# Patient Record
Sex: FEMALE | Race: OTHER | Employment: OTHER | URBAN - METROPOLITAN AREA
[De-identification: names, ages, dates, MRNs, and addresses within clinical notes are randomized per-mention and may not be internally consistent; named-entity substitution may affect disease eponyms.]

---

## 2019-06-27 ENCOUNTER — ED HISTORICAL/CONVERTED ENCOUNTER (OUTPATIENT)
Dept: OTHER | Age: 59
End: 2019-06-27

## 2019-07-01 ENCOUNTER — ED HISTORICAL/CONVERTED ENCOUNTER (OUTPATIENT)
Dept: OTHER | Age: 59
End: 2019-07-01

## 2019-12-07 ENCOUNTER — ED HISTORICAL/CONVERTED ENCOUNTER (OUTPATIENT)
Dept: OTHER | Age: 59
End: 2019-12-07

## 2022-02-16 ENCOUNTER — TRANSCRIBE ORDER (OUTPATIENT)
Dept: SCHEDULING | Age: 62
End: 2022-02-16

## 2022-02-16 DIAGNOSIS — K58.1 IRRITABLE BOWEL SYNDROME WITH CONSTIPATION: ICD-10-CM

## 2022-02-16 DIAGNOSIS — R12 HEARTBURN: ICD-10-CM

## 2022-02-16 DIAGNOSIS — K21.9 GERD (GASTROESOPHAGEAL REFLUX DISEASE): ICD-10-CM

## 2022-02-16 DIAGNOSIS — Z86.19 HISTORY OF HELICOBACTER INFECTION: ICD-10-CM

## 2022-02-16 DIAGNOSIS — R11.10 REGURGITATION OF FOOD: ICD-10-CM

## 2022-02-16 DIAGNOSIS — Z86.010 PERSONAL HISTORY OF COLONIC POLYPS: ICD-10-CM

## 2022-02-16 DIAGNOSIS — R11.0 NAUSEA: ICD-10-CM

## 2022-02-16 DIAGNOSIS — R10.9 LEFT SIDED ABDOMINAL PAIN: Primary | ICD-10-CM

## 2022-02-16 DIAGNOSIS — Z80.0 FAMILY HISTORY OF COLON CANCER: ICD-10-CM

## 2022-03-01 ENCOUNTER — TRANSCRIBE ORDER (OUTPATIENT)
Dept: SCHEDULING | Age: 62
End: 2022-03-01

## 2022-03-01 DIAGNOSIS — Z86.19 HISTORY OF HELICOBACTER PYLORI INFECTION: ICD-10-CM

## 2022-03-01 DIAGNOSIS — R11.10 REGURGITATION OF FOOD: ICD-10-CM

## 2022-03-01 DIAGNOSIS — R10.9 LEFT SIDED ABDOMINAL PAIN: Primary | ICD-10-CM

## 2022-03-01 DIAGNOSIS — K58.1 IRRITABLE BOWEL SYNDROME WITH CONSTIPATION: ICD-10-CM

## 2022-03-01 DIAGNOSIS — R12 HEARTBURN: ICD-10-CM

## 2022-03-01 DIAGNOSIS — K21.9 GERD (GASTROESOPHAGEAL REFLUX DISEASE): ICD-10-CM

## 2022-03-01 DIAGNOSIS — R11.0 NAUSEA: ICD-10-CM

## 2022-03-02 ENCOUNTER — TRANSCRIBE ORDER (OUTPATIENT)
Dept: SCHEDULING | Age: 62
End: 2022-03-02

## 2022-03-02 DIAGNOSIS — K21.9 ESOPHAGEAL REFLUX: Primary | ICD-10-CM

## 2022-03-04 ENCOUNTER — HOSPITAL ENCOUNTER (OUTPATIENT)
Dept: CT IMAGING | Age: 62
Discharge: HOME | End: 2022-03-04
Payer: OTHER GOVERNMENT

## 2022-03-04 DIAGNOSIS — R11.10 REGURGITATION OF FOOD: ICD-10-CM

## 2022-03-04 DIAGNOSIS — R10.9 LEFT SIDED ABDOMINAL PAIN: ICD-10-CM

## 2022-03-04 DIAGNOSIS — K21.9 GERD (GASTROESOPHAGEAL REFLUX DISEASE): ICD-10-CM

## 2022-03-04 DIAGNOSIS — R12 HEARTBURN: ICD-10-CM

## 2022-03-04 DIAGNOSIS — R11.0 NAUSEA: ICD-10-CM

## 2022-03-04 DIAGNOSIS — K58.1 IRRITABLE BOWEL SYNDROME WITH CONSTIPATION: ICD-10-CM

## 2022-03-04 DIAGNOSIS — Z86.19 HISTORY OF HELICOBACTER PYLORI INFECTION: ICD-10-CM

## 2022-03-04 PROCEDURE — 74176 CT ABD & PELVIS W/O CONTRAST: CPT

## 2022-06-29 ENCOUNTER — OFFICE VISIT (OUTPATIENT)
Dept: OBGYN CLINIC | Age: 62
End: 2022-06-29
Payer: OTHER GOVERNMENT

## 2022-06-29 VITALS
DIASTOLIC BLOOD PRESSURE: 76 MMHG | WEIGHT: 287.13 LBS | BODY MASS INDEX: 50.88 KG/M2 | SYSTOLIC BLOOD PRESSURE: 134 MMHG | HEIGHT: 63 IN

## 2022-06-29 DIAGNOSIS — Z00.00 ANNUAL VISIT FOR GENERAL ADULT MEDICAL EXAMINATION WITHOUT ABNORMAL FINDINGS: Primary | ICD-10-CM

## 2022-06-29 DIAGNOSIS — Z11.51 SCREENING FOR HUMAN PAPILLOMAVIRUS: ICD-10-CM

## 2022-06-29 DIAGNOSIS — Z01.419 PAP SMEAR, AS PART OF ROUTINE GYNECOLOGICAL EXAMINATION: ICD-10-CM

## 2022-06-29 PROBLEM — M25.512 PAIN IN LEFT SHOULDER: Status: ACTIVE | Noted: 2022-06-29

## 2022-06-29 PROBLEM — N39.3 FEMALE STRESS INCONTINENCE: Status: ACTIVE | Noted: 2022-06-29

## 2022-06-29 PROBLEM — H20.9 IRITIS: Status: ACTIVE | Noted: 2022-06-29

## 2022-06-29 PROBLEM — R00.2 PALPITATIONS: Status: ACTIVE | Noted: 2018-01-04

## 2022-06-29 PROBLEM — L21.9 SEBORRHEIC DERMATITIS: Status: ACTIVE | Noted: 2022-06-29

## 2022-06-29 PROBLEM — M79.673 PAIN OF FOOT: Status: ACTIVE | Noted: 2022-06-29

## 2022-06-29 PROBLEM — R06.09 OTHER DYSPNEA AND RESPIRATORY ABNORMALITY: Status: ACTIVE | Noted: 2022-06-29

## 2022-06-29 PROBLEM — R42 VERTIGO: Status: ACTIVE | Noted: 2018-01-04

## 2022-06-29 PROBLEM — K46.9 ABDOMINAL HERNIA: Status: ACTIVE | Noted: 2018-01-04

## 2022-06-29 PROBLEM — M54.50 LOW BACK PAIN: Status: ACTIVE | Noted: 2018-02-13

## 2022-06-29 PROBLEM — L30.4 ERYTHEMA INTERTRIGO: Status: ACTIVE | Noted: 2019-01-29

## 2022-06-29 PROBLEM — J30.9 ALLERGIC RHINITIS, UNSPECIFIED: Status: ACTIVE | Noted: 2018-02-13

## 2022-06-29 PROBLEM — M54.2 CERVICALGIA: Status: ACTIVE | Noted: 2019-07-02

## 2022-06-29 PROBLEM — E78.5 HYPERLIPIDEMIA, UNSPECIFIED: Status: ACTIVE | Noted: 2017-10-06

## 2022-06-29 PROBLEM — M72.2 PLANTAR FASCIITIS: Status: ACTIVE | Noted: 2018-01-04

## 2022-06-29 PROBLEM — E11.9 DIABETES (HCC): Status: ACTIVE | Noted: 2018-01-04

## 2022-06-29 PROBLEM — R09.89 OTHER DYSPNEA AND RESPIRATORY ABNORMALITY: Status: ACTIVE | Noted: 2022-06-29

## 2022-06-29 PROBLEM — R55 SYNCOPE: Status: ACTIVE | Noted: 2018-01-04

## 2022-06-29 PROBLEM — G47.30 SLEEP APNEA: Status: ACTIVE | Noted: 2017-09-27

## 2022-06-29 PROBLEM — E03.9 HYPOTHYROIDISM: Status: ACTIVE | Noted: 2018-01-04

## 2022-06-29 PROBLEM — R07.9 CHEST PAIN: Status: ACTIVE | Noted: 2018-01-04

## 2022-06-29 PROBLEM — N95.2 POSTMENOPAUSAL ATROPHIC VAGINITIS: Status: ACTIVE | Noted: 2018-02-13

## 2022-06-29 PROBLEM — R25.1 TREMOR: Status: ACTIVE | Noted: 2022-04-22

## 2022-06-29 PROBLEM — K29.60 OTHER GASTRITIS WITHOUT BLEEDING: Status: ACTIVE | Noted: 2019-06-28

## 2022-06-29 PROBLEM — J45.40 MODERATE PERSISTENT ASTHMA, UNCOMPLICATED: Status: ACTIVE | Noted: 2019-01-29

## 2022-06-29 PROBLEM — M17.11 UNILATERAL PRIMARY OSTEOARTHRITIS, RIGHT KNEE: Status: ACTIVE | Noted: 2022-06-29

## 2022-06-29 PROBLEM — H81.10 BENIGN PAROXYSMAL POSITIONAL VERTIGO: Status: ACTIVE | Noted: 2022-06-29

## 2022-06-29 PROBLEM — R60.9 EDEMA: Status: ACTIVE | Noted: 2018-01-04

## 2022-06-29 PROBLEM — M19.90 OSTEOARTHROSIS: Status: ACTIVE | Noted: 2022-06-29

## 2022-06-29 PROBLEM — R41.82 ALTERED MENTAL STATUS: Status: ACTIVE | Noted: 2022-03-31

## 2022-06-29 PROBLEM — E11.9 TYPE 2 DIABETES MELLITUS WITHOUT COMPLICATIONS (HCC): Status: ACTIVE | Noted: 2019-02-14

## 2022-06-29 PROBLEM — K57.92 DIVERTICULITIS: Status: ACTIVE | Noted: 2018-01-04

## 2022-06-29 PROBLEM — K64.9 UNSPECIFIED HEMORRHOIDS: Status: ACTIVE | Noted: 2019-06-28

## 2022-06-29 PROCEDURE — 99386 PREV VISIT NEW AGE 40-64: CPT | Performed by: OBSTETRICS & GYNECOLOGY

## 2022-06-29 RX ORDER — BENZONATATE 100 MG/1
CAPSULE ORAL
COMMUNITY

## 2022-06-29 RX ORDER — TROSPIUM CHLORIDE 20 MG/1
20 TABLET, FILM COATED ORAL 2 TIMES DAILY
COMMUNITY

## 2022-06-29 RX ORDER — CETIRIZINE HCL 10 MG
10 TABLET ORAL DAILY
COMMUNITY

## 2022-06-29 RX ORDER — IBUPROFEN 600 MG/1
800 TABLET ORAL
COMMUNITY

## 2022-06-29 RX ORDER — CONJUGATED ESTROGENS 0.62 MG/G
CREAM VAGINAL
COMMUNITY

## 2022-06-29 RX ORDER — BISACODYL 5 MG
TABLET, DELAYED RELEASE (ENTERIC COATED) ORAL
COMMUNITY
Start: 2022-06-16

## 2022-06-29 RX ORDER — ATORVASTATIN CALCIUM 20 MG/1
20 TABLET, FILM COATED ORAL DAILY
COMMUNITY

## 2022-06-29 RX ORDER — OXYMETAZOLINE HCL 0.05 %
SPRAY, NON-AEROSOL (ML) NASAL
COMMUNITY
Start: 2021-09-01

## 2022-06-29 RX ORDER — HYDROXYZINE 25 MG/1
TABLET, FILM COATED ORAL
COMMUNITY

## 2022-06-29 RX ORDER — FLUTICASONE PROPIONATE AND SALMETEROL 500; 50 UG/1; UG/1
POWDER RESPIRATORY (INHALATION)
COMMUNITY
Start: 2021-09-01 | End: 2022-09-01

## 2022-06-29 RX ORDER — OMEPRAZOLE 20 MG/1
20 CAPSULE, DELAYED RELEASE ORAL
COMMUNITY
Start: 2022-02-15 | End: 2023-02-15

## 2022-06-29 RX ORDER — DICLOFENAC SODIUM 10 MG/G
GEL TOPICAL
COMMUNITY

## 2022-06-29 RX ORDER — BENZTROPINE MESYLATE 2 MG/1
2 TABLET ORAL 3 TIMES DAILY
COMMUNITY
Start: 2022-04-01

## 2022-06-29 RX ORDER — FLUTICASONE PROPIONATE AND SALMETEROL 100; 50 UG/1; UG/1
POWDER RESPIRATORY (INHALATION)
COMMUNITY

## 2022-06-29 RX ORDER — GABAPENTIN 600 MG/1
600 TABLET ORAL 3 TIMES DAILY
COMMUNITY

## 2022-06-29 RX ORDER — BLOOD-GLUCOSE METER
KIT MISCELLANEOUS
COMMUNITY

## 2022-06-29 RX ORDER — CODEINE PHOSPHATE AND GUAIFENESIN 10; 100 MG/5ML; MG/5ML
SOLUTION ORAL
COMMUNITY

## 2022-06-29 RX ORDER — RANITIDINE 150 MG/1
TABLET, FILM COATED ORAL
COMMUNITY

## 2022-06-29 RX ORDER — ALBUTEROL SULFATE 90 UG/1
AEROSOL, METERED RESPIRATORY (INHALATION)
COMMUNITY

## 2022-06-29 RX ORDER — GUAIFENESIN/DEXTROMETHORPHAN 100-10MG/5
SYRUP ORAL
COMMUNITY

## 2022-06-29 RX ORDER — INSULIN PUMP SYRINGE, 3 ML
EACH MISCELLANEOUS
COMMUNITY

## 2022-06-29 RX ORDER — BLOOD-GLUCOSE METER
EACH MISCELLANEOUS
COMMUNITY

## 2022-06-29 RX ORDER — MONTELUKAST SODIUM 10 MG/1
10 TABLET ORAL AT BEDTIME
COMMUNITY

## 2022-06-29 RX ORDER — HYDROCORTISONE 25 MG/G
CREAM TOPICAL
COMMUNITY

## 2022-06-29 RX ORDER — BLOOD-GLUCOSE CONTROL, NORMAL
EACH MISCELLANEOUS
COMMUNITY

## 2022-06-29 RX ORDER — AZITHROMYCIN 250 MG/1
250 TABLET, FILM COATED ORAL
COMMUNITY
Start: 2021-11-15 | End: 2022-11-15

## 2022-06-29 NOTE — PROGRESS NOTES
Devin Silvestre is a 64 y.o. female who presents today for the following:  Chief Complaint   Patient presents with    Annual Exam        Allergies   Allergen Reactions    Latex Itching and Rash     Reaction Type: Allergy      Aspirin Other (comments), Anaphylaxis and Swelling     inflammation  Other reaction(s): Unknown  Reaction Type: Allergy; Reaction(s): inflammation/rash  inflammation      Oxycodone Shortness of Breath    Hydrocodone-Acetaminophen Swelling    Iodine Other (comments)       Current Outpatient Medications   Medication Sig    benztropine (COGENTIN) 2 mg tablet Take 2 mg by mouth three (3) times daily.  fluticasone propion-salmeteroL (Advair Diskus) 500-50 mcg/dose diskus inhaler Take  by inhalation.  bisacodyL (DULCOLAX) 5 mg EC tablet Do not crush, chew, or split. Take as directed    azithromycin (ZITHROMAX) 250 mg tablet Take 250 mg by mouth.  oxymetazoline (AFRIN) 0.05 % nasal spray     omeprazole (PRILOSEC) 20 mg capsule Take 20 mg by mouth.  lancets (BD Ultra-Fine II Lancets) 30 gauge misc BD Ultra-Fine II Lancets 30 gauge    Blood-Glucose Meter misc FreeStyle Freedom Lite kit    Blood-Glucose Meter (FreeStyle Freedom Lite) monitoring kit FreeStyle Freedom Lite kit    glucose blood VI test strips (FreeStyle Lite Strips) strip FreeStyle Lite Strips    tramadol HCl/acetaminophen (TRAMADOL-ACETAMINOPHEN PO) tramadol    gabapentin (NEURONTIN) 600 mg tablet Take 600 mg by mouth three (3) times daily.  hydrOXYzine HCL (ATARAX) 25 mg tablet hydroxyzine HCl 25 mg tablet    cetirizine (ZYRTEC) 10 mg tablet Take 10 mg by mouth daily.  atorvastatin (LIPITOR) 20 mg tablet Take 20 mg by mouth daily.     benzonatate (TESSALON) 100 mg capsule benzonatate 100 mg capsule    albuterol (PROVENTIL HFA, VENTOLIN HFA, PROAIR HFA) 90 mcg/actuation inhaler ProAir HFA 90 mcg/actuation aerosol inhaler    fluticasone propion-salmeteroL (ADVAIR/WIXELA) 100-50 mcg/dose diskus inhaler Advair Diskus 100 mcg-50 mcg/dose powder for inhalation    raNITIdine (ZANTAC) 150 mg tablet ranitidine 150 mg tablet    montelukast (SINGULAIR) 10 mg tablet Take 10 mg by mouth At bedtime.  fluticasone (VERAMYST) 27.5 mcg/actuation nasal spray 2 Sprays by Nasal route daily.  guaiFENesin-dextromethorphan (ROBITUSSIN DM) 100-10 mg/5 mL syrup Cough Suppressant-Expectorant 10 mg-100 mg/5 mL oral syrup    ibuprofen (MOTRIN) 600 mg tablet Take 800 mg by mouth every six (6) hours as needed.  guaiFENesin-codeine (Cheratussin AC) 100-10 mg/5 mL solution Cheratussin AC 10 mg-100 mg/5 mL oral liquid    hydrocortisone (Proctozone-HC) 2.5 % rectal cream Proctozone-HC 2.5 % topical cream perineal applicator    diclofenac (Voltaren) 1 % gel Voltaren 1 % topical gel    trospium (SANCTURA) 20 mg tablet Take 20 mg by mouth two (2) times a day.  conjugated estrogens (Premarin) 0.625 mg/gram vaginal cream Premarin 0.625 mg/gram vaginal cream    acetaminophen (TYLENOL) 325 mg tablet Take  by mouth every four (4) hours as needed for Pain.  metFORMIN ER (GLUCOPHAGE XR) 500 mg tablet Take 1 Tab by mouth two (2) times daily (with meals).  levothyroxine (SYNTHROID) 25 mcg tablet Take 1 Tab by mouth Daily (before breakfast). Except Sundays    Insulin Needles, Disposable, (ALEIDA PEN NEEDLE) 32 x 5/32 \" ndle Use once daily    furosemide (LASIX) 40 mg tablet Take  by mouth daily.  ESOMEPRAZOLE MAGNESIUM (NEXIUM PO) Take  by mouth.  linaclotide (LINZESS) 290 mcg cap Take  by mouth.  traMADol (ULTRAM) 50 mg tablet Take 50 mg by mouth every six (6) hours as needed for Pain.  potassium chloride SA (MICRO-K) 10 mEq capsule Take 10 mEq by mouth daily.  OXYCODONE HCL/ACETAMINOPHEN (PERCOCET PO) Take  by mouth as needed.  HYDROCODONE/ACETAMINOPHEN (VICODIN PO) Take  by mouth as needed. No current facility-administered medications for this visit. No past medical history on file.     No past surgical history on file. No family history on file. Social History     Socioeconomic History    Marital status:      Spouse name: Not on file    Number of children: Not on file    Years of education: Not on file    Highest education level: Not on file   Occupational History    Not on file   Tobacco Use    Smoking status: Never Smoker    Smokeless tobacco: Not on file   Substance and Sexual Activity    Alcohol use: Yes     Comment: occasionally    Drug use: No    Sexual activity: Not on file   Other Topics Concern    Not on file   Social History Narrative    Not on file     Social Determinants of Health     Financial Resource Strain:     Difficulty of Paying Living Expenses: Not on file   Food Insecurity:     Worried About Running Out of Food in the Last Year: Not on file    Petros of Food in the Last Year: Not on file   Transportation Needs:     Lack of Transportation (Medical): Not on file    Lack of Transportation (Non-Medical): Not on file   Physical Activity:     Days of Exercise per Week: Not on file    Minutes of Exercise per Session: Not on file   Stress:     Feeling of Stress : Not on file   Social Connections:     Frequency of Communication with Friends and Family: Not on file    Frequency of Social Gatherings with Friends and Family: Not on file    Attends Mosque Services: Not on file    Active Member of [address] [place] [address] Rentabilities or Organizations: Not on file    Attends Club or Organization Meetings: Not on file    Marital Status: Not on file   Intimate Partner Violence:     Fear of Current or Ex-Partner: Not on file    Emotionally Abused: Not on file    Physically Abused: Not on file    Sexually Abused: Not on file   Housing Stability:     Unable to Pay for Housing in the Last Year: Not on file    Number of [place] in the Last Year: Not on file    Unstable Housing in the Last Year: Not on file         ROS   Review of Systems   Constitutional: Negative. HENT: Negative. Eyes: Negative. Respiratory: Negative. Cardiovascular: Negative. Gastrointestinal: Negative. Genitourinary: Negative. Musculoskeletal: Negative. Skin: Negative. Neurological: Negative. Endo/Heme/Allergies: Negative. Psychiatric/Behavioral: Negative. /76   Ht 5' 3\" (1.6 m)   Wt 287 lb 2 oz (130.2 kg)   BMI 50.86 kg/m²    OBGyn Exam   Constitutional  · Appearance: well-nourished, well developed, alert, in no acute distress    HENT  · Head and Face: appears normal    Neck  · Inspection/Palpation: normal appearance, no masses or tenderness  · Lymph Nodes: no lymphadenopathy present  · Thyroid: gland size normal, nontender, no nodules or masses present on palpation    Breasts   Symmetric, no palpable masses, no tenderness, no skin changes, no nipple abnormality, no nipple discharge, no lymphadenopathy.     Chest  · Respiratory Effort: breathing labored  · Auscultation: normal breath sounds    Cardiovascular  · Heart:  · Auscultation: regular rate and rhythm without murmur    Gastrointestinal  · Abdominal Examination: abdomen non-tender to palpation, normal bowel sounds, no masses present  · Liver and spleen: no hepatomegaly present, spleen not palpable  · Hernias: no hernias identified    Genitourinary  · External Genitalia: normal appearance for age, no discharge present, no tenderness present, no inflammatory lesions present, no masses present, no atrophy present  · Vagina: normal vaginal vault without central or paravaginal defects, no discharge present, no inflammatory lesions present, no masses present  · Bladder: non-tender to palpation  · Urethra: appears normal  · Cervix: normal   · Uterus:LIMITED EXAM DUE TO PT BODY HABITUS  · Adnexa: LIMITED EXAM DUE TO PT BODY HABITUS  · Perineum: perineum within normal limits, no evidence of trauma, no rashes or skin lesions present  · Anus: anus within normal limits, no hemorrhoids present  · Inguinal Lymph Nodes: no lymphadenopathy present    Skin  · General Inspection: no rash, no lesions identified    Neurologic/Psychiatric  · Mental Status:  · Orientation: grossly oriented to person, place and time  · Mood and Affect: mood normal, affect appropriate    No results found for this visit on 06/29/22. Orders Placed This Encounter    lancets (BD Ultra-Fine II Lancets) 30 gauge misc     Sig: BD Ultra-Fine II Lancets 30 gauge    Blood-Glucose Meter misc     Sig: FreeStyle Freedom Lite kit    Blood-Glucose Meter (FreeStyle Freedom Lite) monitoring kit     Sig: FreeStyle Freedom Lite kit    glucose blood VI test strips (FreeStyle Lite Strips) strip     Sig: FreeStyle Lite Strips    tramadol HCl/acetaminophen (TRAMADOL-ACETAMINOPHEN PO)     Sig: tramadol    gabapentin (NEURONTIN) 600 mg tablet     Sig: Take 600 mg by mouth three (3) times daily.  hydrOXYzine HCL (ATARAX) 25 mg tablet     Sig: hydroxyzine HCl 25 mg tablet    cetirizine (ZYRTEC) 10 mg tablet     Sig: Take 10 mg by mouth daily.  atorvastatin (LIPITOR) 20 mg tablet     Sig: Take 20 mg by mouth daily.  benztropine (COGENTIN) 2 mg tablet     Sig: Take 2 mg by mouth three (3) times daily.  benzonatate (TESSALON) 100 mg capsule     Sig: benzonatate 100 mg capsule    albuterol (PROVENTIL HFA, VENTOLIN HFA, PROAIR HFA) 90 mcg/actuation inhaler     Sig: ProAir HFA 90 mcg/actuation aerosol inhaler    fluticasone propion-salmeteroL (ADVAIR/WIXELA) 100-50 mcg/dose diskus inhaler     Sig: Advair Diskus 100 mcg-50 mcg/dose powder for inhalation    fluticasone propion-salmeteroL (Advair Diskus) 500-50 mcg/dose diskus inhaler     Sig: Take  by inhalation.  raNITIdine (ZANTAC) 150 mg tablet     Sig: ranitidine 150 mg tablet    bisacodyL (DULCOLAX) 5 mg EC tablet     Sig: Do not crush, chew, or split. Take as directed    montelukast (SINGULAIR) 10 mg tablet     Sig: Take 10 mg by mouth At bedtime.  azithromycin (ZITHROMAX) 250 mg tablet     Sig: Take 250 mg by mouth.     fluticasone (VERAMYST) 27.5 mcg/actuation nasal spray     Si Sprays by Nasal route daily.  guaiFENesin-dextromethorphan (ROBITUSSIN DM) 100-10 mg/5 mL syrup     Sig: Cough Suppressant-Expectorant 10 mg-100 mg/5 mL oral syrup    oxymetazoline (AFRIN) 0.05 % nasal spray    ibuprofen (MOTRIN) 600 mg tablet     Sig: Take 800 mg by mouth every six (6) hours as needed.  guaiFENesin-codeine (Cheratussin AC) 100-10 mg/5 mL solution     Sig: Cheratussin AC 10 mg-100 mg/5 mL oral liquid    hydrocortisone (Proctozone-HC) 2.5 % rectal cream     Sig: Proctozone-HC 2.5 % topical cream perineal applicator    diclofenac (Voltaren) 1 % gel     Sig: Voltaren 1 % topical gel    trospium (SANCTURA) 20 mg tablet     Sig: Take 20 mg by mouth two (2) times a day.  conjugated estrogens (Premarin) 0.625 mg/gram vaginal cream     Sig: Premarin 0.625 mg/gram vaginal cream    omeprazole (PRILOSEC) 20 mg capsule     Sig: Take 20 mg by mouth. 65 yo ANNUAL  HX OF LUNG DISEASE  HX OF URINARY INCONTINENCE, FOLLOWED BY UROLOGY  MORBID OBESITY    1.  Annual visit for general adult medical examination without abnormal findings

## 2022-06-30 LAB
CYTOLOGIST CVX/VAG CYTO: NORMAL
CYTOLOGY CVX/VAG DOC CYTO: NORMAL
CYTOLOGY CVX/VAG DOC THIN PREP: NORMAL
DX ICD CODE: NORMAL
LABCORP, 190119: NORMAL
Lab: NORMAL
OTHER STN SPEC: NORMAL
STAT OF ADQ CVX/VAG CYTO-IMP: NORMAL

## 2022-07-05 ENCOUNTER — OFFICE VISIT (OUTPATIENT)
Dept: ORTHOPEDIC SURGERY | Age: 62
End: 2022-07-05
Payer: OTHER GOVERNMENT

## 2022-07-05 VITALS — HEIGHT: 63 IN | BODY MASS INDEX: 50.68 KG/M2 | WEIGHT: 286 LBS

## 2022-07-05 DIAGNOSIS — M25.561 CHRONIC PAIN OF BOTH KNEES: Primary | ICD-10-CM

## 2022-07-05 DIAGNOSIS — M25.562 CHRONIC PAIN OF BOTH KNEES: Primary | ICD-10-CM

## 2022-07-05 DIAGNOSIS — M17.0 BILATERAL PRIMARY OSTEOARTHRITIS OF KNEE: ICD-10-CM

## 2022-07-05 DIAGNOSIS — G89.29 CHRONIC PAIN OF BOTH KNEES: Primary | ICD-10-CM

## 2022-07-05 PROCEDURE — 99213 OFFICE O/P EST LOW 20 MIN: CPT | Performed by: ORTHOPAEDIC SURGERY

## 2022-07-05 PROCEDURE — 20610 DRAIN/INJ JOINT/BURSA W/O US: CPT | Performed by: ORTHOPAEDIC SURGERY

## 2022-07-05 RX ORDER — TRIAMCINOLONE ACETONIDE 40 MG/ML
40 INJECTION, SUSPENSION INTRA-ARTICULAR; INTRAMUSCULAR ONCE
Status: COMPLETED | OUTPATIENT
Start: 2022-07-05 | End: 2022-07-05

## 2022-07-05 RX ORDER — LIDOCAINE HYDROCHLORIDE 10 MG/ML
2 INJECTION INFILTRATION; PERINEURAL ONCE
Status: COMPLETED | OUTPATIENT
Start: 2022-07-05 | End: 2022-07-05

## 2022-07-05 RX ADMIN — TRIAMCINOLONE ACETONIDE 40 MG: 40 INJECTION, SUSPENSION INTRA-ARTICULAR; INTRAMUSCULAR at 17:28

## 2022-07-05 RX ADMIN — LIDOCAINE HYDROCHLORIDE 2 ML: 10 INJECTION INFILTRATION; PERINEURAL at 17:27

## 2022-07-05 NOTE — PROGRESS NOTES
[address] [facility] (: 1960) is a 64 y.o. female, patient, here for evaluation of the following chief complaint(s):  Knee Pain (work injury in  falling down stairs injuried right knee, fall outside in 2019 injuried left knee )       HPI:    Chief complaint is bilateral knee pain. She is had multiple falls. Here today for recheck both knees. Patient's BMI is 50.66. Allergies   Allergen Reactions    Latex Itching and Rash     Reaction Type: Allergy      Aspirin Other (comments), Anaphylaxis and Swelling     inflammation  Other reaction(s): Unknown  Reaction Type: Allergy; Reaction(s): inflammation/rash  inflammation      Oxycodone Shortness of Breath    Hydrocodone-Acetaminophen Swelling    Iodine Other (comments)       Current Outpatient Medications   Medication Sig    lancets (BD Ultra-Fine II Lancets) 30 gauge misc BD Ultra-Fine II Lancets 30 gauge    Blood-Glucose Meter misc FreeStyle Freedom Lite kit    Blood-Glucose Meter (FreeStyle Freedom Lite) monitoring kit FreeStyle Freedom Lite kit    glucose blood VI test strips (FreeStyle Lite Strips) strip FreeStyle Lite Strips    tramadol HCl/acetaminophen (TRAMADOL-ACETAMINOPHEN PO) tramadol    gabapentin (NEURONTIN) 600 mg tablet Take 600 mg by mouth three (3) times daily.  hydrOXYzine HCL (ATARAX) 25 mg tablet hydroxyzine HCl 25 mg tablet    cetirizine (ZYRTEC) 10 mg tablet Take 10 mg by mouth daily.  atorvastatin (LIPITOR) 20 mg tablet Take 20 mg by mouth daily.  benztropine (COGENTIN) 2 mg tablet Take 2 mg by mouth three (3) times daily.     benzonatate (TESSALON) 100 mg capsule benzonatate 100 mg capsule    albuterol (PROVENTIL HFA, VENTOLIN HFA, PROAIR HFA) 90 mcg/actuation inhaler ProAir HFA 90 mcg/actuation aerosol inhaler    fluticasone propion-salmeteroL (ADVAIR/WIXELA) 100-50 mcg/dose diskus inhaler Advair Diskus 100 mcg-50 mcg/dose powder for inhalation    fluticasone propion-salmeteroL (Advair Diskus) 500-50 mcg/dose diskus inhaler Take  by inhalation.  raNITIdine (ZANTAC) 150 mg tablet ranitidine 150 mg tablet    bisacodyL (DULCOLAX) 5 mg EC tablet Do not crush, chew, or split. Take as directed    montelukast (SINGULAIR) 10 mg tablet Take 10 mg by mouth At bedtime.  azithromycin (ZITHROMAX) 250 mg tablet Take 250 mg by mouth.  fluticasone (VERAMYST) 27.5 mcg/actuation nasal spray 2 Sprays by Nasal route daily.  guaiFENesin-dextromethorphan (ROBITUSSIN DM) 100-10 mg/5 mL syrup Cough Suppressant-Expectorant 10 mg-100 mg/5 mL oral syrup    oxymetazoline (AFRIN) 0.05 % nasal spray     ibuprofen (MOTRIN) 600 mg tablet Take 800 mg by mouth every six (6) hours as needed.  guaiFENesin-codeine (Cheratussin AC) 100-10 mg/5 mL solution Cheratussin AC 10 mg-100 mg/5 mL oral liquid    hydrocortisone (Proctozone-HC) 2.5 % rectal cream Proctozone-HC 2.5 % topical cream perineal applicator    diclofenac (Voltaren) 1 % gel Voltaren 1 % topical gel    trospium (SANCTURA) 20 mg tablet Take 20 mg by mouth two (2) times a day.  conjugated estrogens (Premarin) 0.625 mg/gram vaginal cream Premarin 0.625 mg/gram vaginal cream    omeprazole (PRILOSEC) 20 mg capsule Take 20 mg by mouth.  acetaminophen (TYLENOL) 325 mg tablet Take  by mouth every four (4) hours as needed for Pain.  metFORMIN ER (GLUCOPHAGE XR) 500 mg tablet Take 1 Tab by mouth two (2) times daily (with meals).  levothyroxine (SYNTHROID) 25 mcg tablet Take 1 Tab by mouth Daily (before breakfast). Except Sundays    Insulin Needles, Disposable, (ALEIDA PEN NEEDLE) 32 x 5/32 \" ndle Use once daily    furosemide (LASIX) 40 mg tablet Take  by mouth daily.  ESOMEPRAZOLE MAGNESIUM (NEXIUM PO) Take  by mouth.  linaclotide (LINZESS) 290 mcg cap Take  by mouth.  traMADol (ULTRAM) 50 mg tablet Take 50 mg by mouth every six (6) hours as needed for Pain.  potassium chloride SA (MICRO-K) 10 mEq capsule Take 10 mEq by mouth daily.     OXYCODONE HCL/ACETAMINOPHEN (PERCOCET PO) Take  by mouth as needed.  HYDROCODONE/ACETAMINOPHEN (VICODIN PO) Take  by mouth as needed. No current facility-administered medications for this visit. Past Medical History:   Diagnosis Date    Arthritis     Asthma     Diabetes ([facility]     Hyperlipemia     [facility]         History reviewed. No pertinent surgical history. Family History   Problem Relation Age of Onset    Breast Cancer Mother     Heart Disease Mother     Cancer Father     Heart Disease Father         Social History     Socioeconomic History    Marital status:      Spouse name: Not on file    Number of children: Not on file    Years of education: Not on file    Highest education level: Not on file   Occupational History    Not on file   Tobacco Use    Smoking status: Never Smoker    Smokeless tobacco: Never Used   Substance and Sexual Activity    Alcohol use: Yes     Comment: occasionally    Drug use: No    Sexual activity: Yes     Partners: Male     Birth control/protection: None   Other Topics Concern    Not on file   Social History Narrative    Not on file     Social Determinants of Health     Financial Resource Strain:     Difficulty of Paying Living Expenses: Not on file   Food Insecurity:     Worried About Running Out of Food in the Last Year: Not on file    Petros of Food in the Last Year: Not on file   Transportation Needs:     Lack of Transportation (Medical): Not on file    Lack of Transportation (Non-Medical):  Not on file   Physical Activity:     Days of Exercise per Week: Not on file    Minutes of Exercise per Session: Not on file   Stress:     Feeling of Stress : Not on file   Social Connections:     Frequency of Communication with Friends and Family: Not on file    Frequency of Social Gatherings with Friends and Family: Not on file    Attends Judaism Services: Not on file    Active Member of Clubs or Organizations: Not on file    Attends [employer] or Organization Meetings: Not on file    Marital Status: Not on file   Intimate Partner Violence:     Fear of Current or Ex-Partner: Not on file    Emotionally Abused: Not on file    Physically Abused: Not on file    Sexually Abused: Not on file   Housing Stability:     Unable to Pay for Housing in the Last Year: Not on file    Number of [place] in the Last Year: Not on file    Unstable Housing in the Last Year: Not on file       ROS     Positive for: Musculoskeletal    Last edited by Venu Ferrell on 7/5/2022  4:29 PM. (History)            Vitals:  Ht 5' 3\" (1.6 m)   Wt 286 lb (129.7 kg)   BMI 50.66 kg/m²    Body mass index is 50.66 kg/m². PHYSICAL EXAM:  Physical exam today patient's knee range of motion bilaterally is 3 to 120 degrees. No instability is noted. Maybe slight varus overall alignment. No effusions. Both hips have painless range of motion. IMAGING:  XR Results (most recent):  Results from Appointment encounter on 07/05/22    XR KNEES BI MIN 4 V    Narrative  Bilateral knees x-rayed. 4 views in total.  Narrowing of the medial compartments both knees. Slight lateral patellar tracking. ASSESSMENT/PLAN:  1. Chronic pain of both knees  -     XR KNEES BI MIN 4 V; Future  -     REFERRAL TO PHYSICAL THERAPY  -     triamcinolone acetonide (KENALOG-40) 40 mg/mL injection 40 mg; 40 mg, Intra artICUlar, ONCE, 1 dose, On Tue 7/5/22 at 1800  -     lidocaine (XYLOCAINE) 10 mg/mL (1 %) injection 2 mL; 2 mL, Intra artICUlar, ONCE, 1 dose, On Tue 7/5/22 at 1800  -     triamcinolone acetonide (KENALOG-40) 40 mg/mL injection 40 mg; 40 mg, Intra artICUlar, ONCE, 1 dose, On Tue 7/5/22 at 1800  -     lidocaine (XYLOCAINE) 10 mg/mL (1 %) injection 2 mL; 2 mL, Intra artICUlar, ONCE, 1 dose, On Tue 7/5/22 at 1800  2.  Bilateral primary osteoarthritis of knee  -     REFERRAL TO PHYSICAL THERAPY  -     triamcinolone acetonide (KENALOG-40) 40 mg/mL injection 40 mg; 40 mg, Intra artICUlar, ONCE, 1 dose, On Tue 7/5/22 at 1800  -     lidocaine (XYLOCAINE) 10 mg/mL (1 %) injection 2 mL; 2 mL, Intra artICUlar, ONCE, 1 dose, On Tue 7/5/22 at 1800  -     triamcinolone acetonide (KENALOG-40) 40 mg/mL injection 40 mg; 40 mg, Intra artICUlar, ONCE, 1 dose, On Tue 7/5/22 at 1800  -     lidocaine (XYLOCAINE) 10 mg/mL (1 %) injection 2 mL; 2 mL, Intra artICUlar, ONCE, 1 dose, On Tue 7/5/22 at 1800     Not end-stage DJD bilateral knee medial compartments. Patient is a candidate for conservative treatment at this point. She is not a candidate for any surgical intervention due to her BMI over 50. Arthroscopic surgery is not on the table as her joint space narrowing is too severe. She can treat with medications that her primary care could prescribe including NSAIDs. We are available for periodic injections. Physical therapy would be of help for strengthening. She should consider all options with regards to weight loss. Follow-up with us on an as-needed basis. Discussed risks/benefits of cortisone injection and patient gave verbal consent. Under sterile conditions, the bilateral knees were injected with  2cc 1% Lidocaine and 1 cc 40 mg/cc Kenalog intra-articularly, tolerated the procedure well. An electronic signature was used to authenticate this note.   --Emilia Barrientos MD

## 2022-07-05 NOTE — LETTER
7/5/2022    Patient: Bernard Pham   YOB: 1960   Date of Visit: 7/5/2022     Shahid Russo MD  [address] [id number]  Via Fax: [phone]    Dear Shahid Russo MD,      Thank you for referring Ms. Bernard Pham to [facility] for evaluation. My notes for this consultation are attached. If you have questions, please do not hesitate to call me. I look forward to following your patient along with you.       Sincerely,    Archana Knott MD

## 2022-07-21 ENCOUNTER — TELEPHONE (OUTPATIENT)
Dept: OBGYN CLINIC | Age: 62
End: 2022-07-21

## 2022-07-21 ENCOUNTER — OFFICE VISIT (OUTPATIENT)
Dept: OBGYN CLINIC | Age: 62
End: 2022-07-21
Payer: OTHER GOVERNMENT

## 2022-07-21 VITALS
WEIGHT: 285.13 LBS | SYSTOLIC BLOOD PRESSURE: 150 MMHG | BODY MASS INDEX: 50.52 KG/M2 | DIASTOLIC BLOOD PRESSURE: 72 MMHG | HEIGHT: 63 IN

## 2022-07-21 DIAGNOSIS — E66.01 CLASS 2 SEVERE OBESITY DUE TO EXCESS CALORIES WITH SERIOUS COMORBIDITY IN ADULT, UNSPECIFIED BMI (HCC): ICD-10-CM

## 2022-07-21 DIAGNOSIS — N95.0 POSTMENOPAUSAL BLEEDING: Primary | ICD-10-CM

## 2022-07-21 PROBLEM — M25.569 PAIN IN JOINT, LOWER LEG: Status: ACTIVE | Noted: 2022-06-29

## 2022-07-21 PROBLEM — J45.909 ASTHMA: Status: ACTIVE | Noted: 2017-09-27

## 2022-07-21 PROCEDURE — 58100 BIOPSY OF UTERUS LINING: CPT | Performed by: OBSTETRICS & GYNECOLOGY

## 2022-07-21 PROCEDURE — 99213 OFFICE O/P EST LOW 20 MIN: CPT | Performed by: OBSTETRICS & GYNECOLOGY

## 2022-07-21 RX ORDER — FLUTICASONE PROPIONATE 50 MCG
SPRAY, SUSPENSION (ML) NASAL
COMMUNITY
Start: 2022-01-11

## 2022-07-21 RX ORDER — POLYETHYLENE GLYCOL 3350, SODIUM SULFATE ANHYDROUS, SODIUM BICARBONATE, SODIUM CHLORIDE, POTASSIUM CHLORIDE 236; 22.74; 6.74; 5.86; 2.97 G/4L; G/4L; G/4L; G/4L; G/4L
POWDER, FOR SOLUTION ORAL
COMMUNITY
Start: 2022-06-16

## 2022-07-21 RX ORDER — ISOPROPYL ALCOHOL 70 ML/100ML
SWAB TOPICAL
COMMUNITY

## 2022-07-21 NOTE — TELEPHONE ENCOUNTER
Called patient back today,she stated she needs results on pap and she's bleeding from vagina and in pain per patient.07/21/2022 kb

## 2022-07-21 NOTE — PROGRESS NOTES
Dayna Meyer is a 64 y.o. female who presents today for the following:  Chief Complaint   Patient presents with    Vaginal Bleeding     Pt presents with complaints of vaginal spotting x 1 day and \"burning feeling\" inside vagina//MKeeley         Allergies   Allergen Reactions    Latex Itching and Rash     Reaction Type: Allergy      Aspirin Other (comments), Anaphylaxis and Swelling     inflammation  Other reaction(s): Unknown  Reaction Type: Allergy; Reaction(s): inflammation/rash  inflammation      Oxycodone Shortness of Breath    Hydrocodone-Acetaminophen Swelling    Iodine Other (comments)       Current Outpatient Medications   Medication Sig    PEG 3350-Electrolytes (GO-LYTELY) 236-22.74-6.74 -5.86 gram suspension Starting at noon on day prior to procedures, drink 8 ounces every 30 minutes until all gone and stools are clear. May add flavor packet. fluticasone propionate (FLONASE) 50 mcg/actuation nasal spray     alcohol swabs padm Easy Touch Alcohol Prep Pads    lancets (BD Ultra-Fine II Lancets) 30 gauge misc BD Ultra-Fine II Lancets 30 gauge    Blood-Glucose Meter misc FreeStyle Freedom Lite kit    Blood-Glucose Meter (FreeStyle Freedom Lite) monitoring kit FreeStyle Freedom Lite kit    glucose blood VI test strips (FreeStyle Lite Strips) strip FreeStyle Lite Strips    tramadol HCl/acetaminophen (TRAMADOL-ACETAMINOPHEN PO) tramadol    gabapentin (NEURONTIN) 600 mg tablet Take 600 mg by mouth three (3) times daily. hydrOXYzine HCL (ATARAX) 25 mg tablet hydroxyzine HCl 25 mg tablet    cetirizine (ZYRTEC) 10 mg tablet Take 10 mg by mouth daily. atorvastatin (LIPITOR) 20 mg tablet Take 20 mg by mouth daily. benztropine (COGENTIN) 2 mg tablet Take 2 mg by mouth three (3) times daily.     benzonatate (TESSALON) 100 mg capsule benzonatate 100 mg capsule    albuterol (PROVENTIL HFA, VENTOLIN HFA, PROAIR HFA) 90 mcg/actuation inhaler ProAir HFA 90 mcg/actuation aerosol inhaler    fluticasone propion-salmeteroL (ADVAIR/WIXELA) 100-50 mcg/dose diskus inhaler Advair Diskus 100 mcg-50 mcg/dose powder for inhalation    fluticasone propion-salmeteroL (Advair Diskus) 500-50 mcg/dose diskus inhaler Take  by inhalation. raNITIdine (ZANTAC) 150 mg tablet ranitidine 150 mg tablet    bisacodyL (DULCOLAX) 5 mg EC tablet Do not crush, chew, or split. Take as directed    montelukast (SINGULAIR) 10 mg tablet Take 10 mg by mouth At bedtime. azithromycin (ZITHROMAX) 250 mg tablet Take 250 mg by mouth. fluticasone (VERAMYST) 27.5 mcg/actuation nasal spray 2 Sprays by Nasal route daily. guaiFENesin-dextromethorphan (ROBITUSSIN DM) 100-10 mg/5 mL syrup Cough Suppressant-Expectorant 10 mg-100 mg/5 mL oral syrup    oxymetazoline (AFRIN) 0.05 % nasal spray     ibuprofen (MOTRIN) 600 mg tablet Take 800 mg by mouth every six (6) hours as needed. guaiFENesin-codeine (Cheratussin AC) 100-10 mg/5 mL solution Cheratussin AC 10 mg-100 mg/5 mL oral liquid    hydrocortisone (Proctozone-HC) 2.5 % rectal cream Proctozone-HC 2.5 % topical cream perineal applicator    diclofenac (Voltaren) 1 % gel Voltaren 1 % topical gel    trospium (SANCTURA) 20 mg tablet Take 20 mg by mouth two (2) times a day. conjugated estrogens (Premarin) 0.625 mg/gram vaginal cream Premarin 0.625 mg/gram vaginal cream    omeprazole (PRILOSEC) 20 mg capsule Take 20 mg by mouth. acetaminophen (TYLENOL) 325 mg tablet Take  by mouth every four (4) hours as needed for Pain.    metFORMIN ER (GLUCOPHAGE XR) 500 mg tablet Take 1 Tab by mouth two (2) times daily (with meals). levothyroxine (SYNTHROID) 25 mcg tablet Take 1 Tab by mouth Daily (before breakfast). Except Sundays    Insulin Needles, Disposable, (ALEIDA PEN NEEDLE) 32 x 5/32 \" ndle Use once daily    furosemide (LASIX) 40 mg tablet Take  by mouth daily. ESOMEPRAZOLE MAGNESIUM (NEXIUM PO) Take  by mouth.    linaclotide (LINZESS) 290 mcg cap Take  by mouth.     traMADol (ULTRAM) 50 mg tablet Take 50 mg by mouth every six (6) hours as needed for Pain.    potassium chloride SA (MICRO-K) 10 mEq capsule Take 10 mEq by mouth daily. OXYCODONE HCL/ACETAMINOPHEN (PERCOCET PO) Take  by mouth as needed. HYDROCODONE/ACETAMINOPHEN (VICODIN PO) Take  by mouth as needed. No current facility-administered medications for this visit. Past Medical History:   Diagnosis Date    Arthritis     Asthma     Diabetes ([facility] [address]     Hyperlipemia     Seizures ([facility] [address]        No past surgical history on file. Family History   Problem Relation Age of Onset    Breast Cancer Mother     Heart Disease Mother     Cancer Father     Heart Disease Father        Social History     Socioeconomic History    Marital status:      Spouse name: Not on file    Number of children: Not on file    Years of education: Not on file    Highest education level: Not on file   Occupational History    Not on file   Tobacco Use    Smoking status: Never    Smokeless tobacco: Never   Substance and Sexual Activity    Alcohol use: Yes     Comment: occasionally    Drug use: No    Sexual activity: Yes     Partners: Male     Birth control/protection: None   Other Topics Concern    Not on file   Social History Narrative    Not on file     Social Determinants of Health     Financial Resource Strain: Not on file   Food Insecurity: Not on file   Transportation Needs: Not on file   Physical Activity: Not on file   Stress: Not on file   Social Connections: Not on file   Intimate Partner Violence: Not on file   Housing Stability: Not on file         ROS   Review of Systems   Constitutional: Negative. HENT: Negative. Eyes: Negative. Respiratory: Negative. Cardiovascular: Negative. Gastrointestinal: Negative. Genitourinary: Negative. Musculoskeletal: Negative. Skin: Negative. Neurological: Negative. Endo/Heme/Allergies: Negative. Psychiatric/Behavioral: Negative.       BP (!) 150/72   Ht 5' 3\" (1.6 m)   Wt 285 lb 2 oz (129.3 kg)   BMI 50.51 kg/m²    OBGyn Exam   Constitutional  Appearance: well-nourished, well developed, alert, in no acute distress    HENT  Head and Face: appears normal    Chest  Respiratory Effort: breathing labored    Gastrointestinal  Abdominal Examination: abdomen non-tender to palpation, normal bowel sounds, no masses present    Genitourinary  External Genitalia: normal appearance for age, no discharge present, no tenderness present, no inflammatory lesions present, no masses present, no atrophy present  Vagina: normal vaginal vault without central or paravaginal defects, no discharge present, no inflammatory lesions present, no masses present  Bladder: non-tender to palpation  Urethra: appears normal  Cervix: normal   Uterus: normal size, shape and consistency  Adnexa: no adnexal tenderness present, no adnexal masses present  Perineum: perineum within normal limits, no evidence of trauma, no rashes or skin lesions present  Anus: anus within normal limits, no hemorrhoids present  Inguinal Lymph Nodes: no lymphadenopathy present    Skin  General Inspection: no rash, no lesions identified    Neurologic/Psychiatric  Mental Status:  Orientation: grossly oriented to person, place and time  Mood and Affect: mood normal, affect appropriate    Results for orders placed or performed in visit on 07/21/22   BIOPSY OF UTERUS LINING    Impression    EMBX        Orders Placed This Encounter    BIOPSY OF UTERUS LINING    alcohol swabs padm     Sig: Easy Touch Alcohol Prep Pads    PEG 3350-Electrolytes (GO-LYTELY) 236-22.74-6.74 -5.86 gram suspension     Sig: Starting at noon on day prior to procedures, drink 8 ounces every 30 minutes until all gone and stools are clear. May add flavor packet. fluticasone propionate (FLONASE) 50 mcg/actuation nasal spray     60 YO WITH PMB  MORBIDLY OBESE    1.  Postmenopausal bleeding  - DISCUSSED PMB INCLUDING RISK OF CANCER, ESPECIALLY IN MORBIDLY OBESE, RISK OF ENDOMETRIAL DYSPLASIA AND POLYPS  - BIOPSY OF UTERUS LINING  - TVUS    2. Class 2 severe obesity due to excess calories with serious comorbidity in adult, unspecified BMI ([facility] [address]    RTC IN 3 WEEKS      Procedures:  Endometrial Biopsy:  Consent Pregnancy test is negative, Informed consent obtained, 30 second time out taken. Prep Cervix was prepped with betadine. Procedure Cervix was grasped with single tooth tenaculum, uterus was sounded to 8 cm, a 4mm pipet was advanced without difficulty, with good return of tissue. Post procedure Patient tolerated procedure well. Instructed can take NSAID as directed for cramping, call or ER if pain persists or worsens. Cautions discussed, pt to call if heavy bleeding, severe pain, or fever. Followup in 2 -4 weeks to discuss results; pt agreed. All questions answered.

## 2022-07-26 LAB
CPT DISCLAIMER: NORMAL
DIAGNOSIS SYNOPSIS:: NORMAL
DX ICD CODE: NORMAL
PATH REPORT.FINAL DX SPEC: NORMAL
PATH REPORT.GROSS SPEC: NORMAL
PATH REPORT.RELEVANT HX SPEC: NORMAL
PATH REPORT.SITE OF ORIGIN SPEC: NORMAL
PATHOLOGIST NAME: NORMAL
PAYMENT PROCEDURE: NORMAL

## 2022-10-28 ENCOUNTER — OFFICE VISIT (OUTPATIENT)
Dept: OBGYN CLINIC | Age: 62
End: 2022-10-28
Payer: OTHER GOVERNMENT

## 2022-10-28 ENCOUNTER — DOCUMENTATION ONLY (OUTPATIENT)
Dept: OBGYN CLINIC | Age: 62
End: 2022-10-28

## 2022-10-28 VITALS
WEIGHT: 290 LBS | SYSTOLIC BLOOD PRESSURE: 142 MMHG | BODY MASS INDEX: 51.38 KG/M2 | HEIGHT: 63 IN | DIASTOLIC BLOOD PRESSURE: 78 MMHG

## 2022-10-28 DIAGNOSIS — J45.20 MILD INTERMITTENT ASTHMA WITHOUT COMPLICATION: ICD-10-CM

## 2022-10-28 DIAGNOSIS — E08.00 DIABETES MELLITUS DUE TO UNDERLYING CONDITION WITH HYPEROSMOLARITY WITHOUT COMA, WITHOUT LONG-TERM CURRENT USE OF INSULIN (HCC): ICD-10-CM

## 2022-10-28 DIAGNOSIS — E78.00 PURE HYPERCHOLESTEROLEMIA: ICD-10-CM

## 2022-10-28 DIAGNOSIS — N95.0 POSTMENOPAUSAL BLEEDING: ICD-10-CM

## 2022-10-28 DIAGNOSIS — N95.2 POSTMENOPAUSAL ATROPHIC VAGINITIS: ICD-10-CM

## 2022-10-28 DIAGNOSIS — Z78.0 MENOPAUSE: Primary | ICD-10-CM

## 2022-10-28 DIAGNOSIS — E66.01 CLASS 2 SEVERE OBESITY DUE TO EXCESS CALORIES WITH SERIOUS COMORBIDITY IN ADULT, UNSPECIFIED BMI (HCC): ICD-10-CM

## 2022-10-28 PROBLEM — U07.1 COVID-19: Status: ACTIVE | Noted: 2022-10-12

## 2022-10-28 PROCEDURE — 99214 OFFICE O/P EST MOD 30 MIN: CPT | Performed by: OBSTETRICS & GYNECOLOGY

## 2022-10-28 NOTE — PROGRESS NOTES
Flako Milligan is a 64 y.o. female who presents today for the following:  Chief Complaint   Patient presents with    Vaginal Bleeding     Pt presents with complaints of vaginal bleeding x 1 week //RICHIEeeyanci         Allergies   Allergen Reactions    Latex Itching and Rash     Reaction Type: Allergy      Aspirin Other (comments), Anaphylaxis and Swelling     inflammation  Other reaction(s): Unknown  Reaction Type: Allergy; Reaction(s): inflammation/rash  inflammation      Oxycodone Shortness of Breath    Hydrocodone-Acetaminophen Swelling    Iodine Other (comments)       Current Outpatient Medications   Medication Sig    alcohol swabs padm Easy Touch Alcohol Prep Pads    PEG 3350-Electrolytes (GO-LYTELY) 236-22.74-6.74 -5.86 gram suspension Starting at noon on day prior to procedures, drink 8 ounces every 30 minutes until all gone and stools are clear. May add flavor packet. fluticasone propionate (FLONASE) 50 mcg/actuation nasal spray     lancets (BD Ultra-Fine II Lancets) 30 gauge misc BD Ultra-Fine II Lancets 30 gauge    Blood-Glucose Meter misc FreeStyle Freedom Lite kit    Blood-Glucose Meter (FreeStyle Freedom Lite) monitoring kit FreeStyle Freedom Lite kit    glucose blood VI test strips (FreeStyle Lite Strips) strip FreeStyle Lite Strips    tramadol HCl/acetaminophen (TRAMADOL-ACETAMINOPHEN PO) tramadol    gabapentin (NEURONTIN) 600 mg tablet Take 600 mg by mouth three (3) times daily. hydrOXYzine HCL (ATARAX) 25 mg tablet hydroxyzine HCl 25 mg tablet    cetirizine (ZYRTEC) 10 mg tablet Take 10 mg by mouth daily. atorvastatin (LIPITOR) 20 mg tablet Take 20 mg by mouth daily. benztropine (COGENTIN) 2 mg tablet Take 2 mg by mouth three (3) times daily.     benzonatate (TESSALON) 100 mg capsule benzonatate 100 mg capsule    albuterol (PROVENTIL HFA, VENTOLIN HFA, PROAIR HFA) 90 mcg/actuation inhaler ProAir HFA 90 mcg/actuation aerosol inhaler    fluticasone propion-salmeteroL (ADVAIR/WIXELA) 100-50 mcg/dose diskus inhaler Advair Diskus 100 mcg-50 mcg/dose powder for inhalation    raNITIdine (ZANTAC) 150 mg tablet ranitidine 150 mg tablet    bisacodyL (DULCOLAX) 5 mg EC tablet Do not crush, chew, or split. Take as directed    montelukast (SINGULAIR) 10 mg tablet Take 10 mg by mouth At bedtime. azithromycin (ZITHROMAX) 250 mg tablet Take 250 mg by mouth. fluticasone (VERAMYST) 27.5 mcg/actuation nasal spray 2 Sprays by Nasal route daily. guaiFENesin-dextromethorphan (ROBITUSSIN DM) 100-10 mg/5 mL syrup Cough Suppressant-Expectorant 10 mg-100 mg/5 mL oral syrup    oxymetazoline (AFRIN) 0.05 % nasal spray     ibuprofen (MOTRIN) 600 mg tablet Take 800 mg by mouth every six (6) hours as needed. guaiFENesin-codeine (Cheratussin AC) 100-10 mg/5 mL solution Cheratussin AC 10 mg-100 mg/5 mL oral liquid    hydrocortisone (Proctozone-HC) 2.5 % rectal cream Proctozone-HC 2.5 % topical cream perineal applicator    diclofenac (Voltaren) 1 % gel Voltaren 1 % topical gel    trospium (SANCTURA) 20 mg tablet Take 20 mg by mouth two (2) times a day. conjugated estrogens (Premarin) 0.625 mg/gram vaginal cream Premarin 0.625 mg/gram vaginal cream    omeprazole (PRILOSEC) 20 mg capsule Take 20 mg by mouth. acetaminophen (TYLENOL) 325 mg tablet Take  by mouth every four (4) hours as needed for Pain.    metFORMIN ER (GLUCOPHAGE XR) 500 mg tablet Take 1 Tab by mouth two (2) times daily (with meals). levothyroxine (SYNTHROID) 25 mcg tablet Take 1 Tab by mouth Daily (before breakfast). Except Sundays    Insulin Needles, Disposable, (ALEIDA PEN NEEDLE) 32 x 5/32 \" ndle Use once daily    furosemide (LASIX) 40 mg tablet Take  by mouth daily. ESOMEPRAZOLE MAGNESIUM (NEXIUM PO) Take  by mouth.    linaclotide (LINZESS) 290 mcg cap Take  by mouth. traMADol (ULTRAM) 50 mg tablet Take 50 mg by mouth every six (6) hours as needed for Pain.    potassium chloride SA (MICRO-K) 10 mEq capsule Take 10 mEq by mouth daily. OXYCODONE HCL/ACETAMINOPHEN (PERCOCET PO) Take  by mouth as needed. HYDROCODONE/ACETAMINOPHEN (VICODIN PO) Take  by mouth as needed. No current facility-administered medications for this visit. Past Medical History:   Diagnosis Date    Arthritis     Asthma     COVID-19 10/12/2022    Diabetes ([facility] [address]     Hyperlipemia     Seizures ([facility] [address]        No past surgical history on file. Family History   Problem Relation Age of Onset    Breast Cancer Mother     Heart Disease Mother     Cancer Father     Heart Disease Father        Social History     Socioeconomic History    Marital status:      Spouse name: Not on file    Number of children: Not on file    Years of education: Not on file    Highest education level: Not on file   Occupational History    Not on file   Tobacco Use    Smoking status: Never    Smokeless tobacco: Never   Substance and Sexual Activity    Alcohol use: Yes     Comment: occasionally    Drug use: No    Sexual activity: Yes     Partners: Male     Birth control/protection: None   Other Topics Concern    Not on file   Social History Narrative    Not on file     Social Determinants of Health     Financial Resource Strain: Not on file   Food Insecurity: Not on file   Transportation Needs: Not on file   Physical Activity: Not on file   Stress: Not on file   Social Connections: Not on file   Intimate Partner Violence: Not on file   Housing Stability: Not on file         ROS   Review of Systems   Constitutional: Negative. HENT: Negative. Eyes: Negative. Respiratory: Negative. Cardiovascular: Negative. Gastrointestinal: Negative. Genitourinary: Negative. Musculoskeletal: Negative. Skin: Negative. Neurological: Negative. Endo/Heme/Allergies: Negative. Psychiatric/Behavioral: Negative.       BP (!) 142/78   Ht 5' 3\" (1.6 m)   Wt 290 lb (131.5 kg)   BMI 51.37 kg/m²    OBGyn Exam   Constitutional  Appearance: well-nourished, well developed, alert, in no acute distress    HENT  Head and Face: appears normal    Chest  Respiratory Effort: breathing labored    Gastrointestinal  Abdominal Examination: abdomen non-tender to palpation, normal bowel sounds, no masses present    Genitourinary  External Genitalia: normal appearance for age, no discharge present, no tenderness present, no inflammatory lesions present, no masses present, no atrophy present  Vagina: normal vaginal vault without central or paravaginal defects, no discharge present, no inflammatory lesions present, no masses present  Bladder: non-tender to palpation  Urethra: appears normal  Cervix: normal   Uterus: normal size, shape and consistency  Adnexa: no adnexal tenderness present, no adnexal masses present  Perineum: perineum within normal limits, no evidence of trauma, no rashes or skin lesions present  Anus: anus within normal limits, no hemorrhoids present  Inguinal Lymph Nodes: no lymphadenopathy present    Skin  General Inspection: no rash, no lesions identified    Neurologic/Psychiatric  Mental Status:  Orientation: grossly oriented to person, place and time  Mood and Affect: mood normal, affect appropriate    No results found for this visit on 10/28/22. Orders Placed This Encounter    [facility] AND [facility]    ESTRADIOL    PROLACTIN    TSH AND FREE T4     Transvaginal Study for PMB     Difficult study due to pt body habitus and bowel gas. Uterus: Anteverted 6.1x4.4x4cm heterogeneous, calcifications seen throughout. Anterior fibroid measuring 1.4x0.8x1.9cm. Cervix: 2.4cm   Endometrial strip seen 0.3cm, not well seen due to heterogeneous uterus. Rt and Lt ovaries obscured by bowel gas. No FF seen in CDS     IMPRESSION:  Normal size uterus with Anterior fibroid measuring 1.4x0.8x1.9cm. Endometrial strip seen 3 mm, not well seen due to heterogeneous uterus.     65 yo MORBIDLY OBESE WITH MULTIPLE MED PROBLEMS C.O MILD SPOTTING  TVUS WITH 3 MM EMS AND SMALL FIBROID  RECENT ASTHMA EXACERBATION AND COVID+, ON MULTIPLE MEDS    1. Menopause  - DISCUSSED BLEEDING AND SX  - [facility] AND [facility]  - ESTRADIOL  - PROLACTIN  - TSH AND FREE T4    2. Postmenopausal bleeding  - TVUS WITH 3 MM EMS  - SMALL FIBROID  - EMBX WITH ESTROGEN AND PROGESTERONE EFFECT  - DISCUSSED PMB AND MOST LIKELY ETIOLOGY  HYPOESTROGENISM, PROGESTERONE EFFECT    3. Postmenopausal atrophic vaginitis  - PREMARIN    4. Diabetes mellitus due to underlying condition with hyperosmolarity without coma, without long-term current use of insulin (HCC)  - MULTIPLE MEDS    5. Pure hypercholesterolemia      6. Class 2 severe obesity due to excess calories with serious comorbidity in adult, unspecified BMI ([facility] [address]      7. Mild intermittent asthma without complication          Follow-up and Dispositions    Return in about 4 weeks (around 11/25/2022).

## 2022-10-29 LAB
ESTRADIOL SERPL-MCNC: <5 PG/ML
FSH SERPL-ACNC: 43.9 MIU/ML
LH SERPL-ACNC: 26.3 MIU/ML
PROLACTIN SERPL-MCNC: 12.2 NG/ML (ref 4.8–23.3)
T4 FREE SERPL-MCNC: 1.24 NG/DL (ref 0.82–1.77)
TSH SERPL DL<=0.005 MIU/L-ACNC: 2.74 UIU/ML (ref 0.45–4.5)

## 2022-11-07 ENCOUNTER — TELEPHONE (OUTPATIENT)
Dept: OBGYN CLINIC | Age: 62
End: 2022-11-07

## 2022-11-08 NOTE — TELEPHONE ENCOUNTER
Spoke with the patient and advised her Dr Dinora Orourke will contact her tomorrow regarding her lab results.

## 2022-11-09 NOTE — TELEPHONE ENCOUNTER
Spoke with the patient and advised her her labs show she is postmenopausal and Dr Leidy Franz would like to discuss these results with her. She was originally scheduled for follow up on 11/28/22 but her appointment has been rescheduled to 11/14/22.

## 2022-11-14 ENCOUNTER — OFFICE VISIT (OUTPATIENT)
Dept: OBGYN CLINIC | Age: 62
End: 2022-11-14
Payer: OTHER GOVERNMENT

## 2022-11-14 VITALS
WEIGHT: 293 LBS | SYSTOLIC BLOOD PRESSURE: 120 MMHG | HEIGHT: 63 IN | DIASTOLIC BLOOD PRESSURE: 68 MMHG | BODY MASS INDEX: 51.91 KG/M2

## 2022-11-14 DIAGNOSIS — N95.1 MENOPAUSAL SYNDROME: Primary | ICD-10-CM

## 2022-11-14 PROCEDURE — 99213 OFFICE O/P EST LOW 20 MIN: CPT | Performed by: OBSTETRICS & GYNECOLOGY

## 2022-11-14 RX ORDER — PAROXETINE 7.5 MG/1
1 CAPSULE ORAL DAILY
Qty: 30 CAPSULE | Refills: 5 | Status: SHIPPED | OUTPATIENT
Start: 2022-11-14

## 2022-11-14 RX ORDER — ESCITALOPRAM OXALATE 10 MG/1
10 TABLET ORAL DAILY
COMMUNITY

## 2022-11-14 NOTE — PROGRESS NOTES
Laurier Fothergill is a 64 y.o. female who presents today for the following:  Chief Complaint   Patient presents with    Results     Pt presents to discuss lab results //Jorgito         Allergies   Allergen Reactions    Latex Itching and Rash     Reaction Type: Allergy      Aspirin Other (comments), Anaphylaxis and Swelling     inflammation  Other reaction(s): Unknown  Reaction Type: Allergy; Reaction(s): inflammation/rash  inflammation      Oxycodone Shortness of Breath    Hydrocodone-Acetaminophen Swelling    Iodine Other (comments)       Current Outpatient Medications   Medication Sig    PARoxetine mesylate,menop.sym, (Brisdelle) 7.5 mg cap Take 1 Tablet by mouth daily. escitalopram oxalate (LEXAPRO) 10 mg tablet Take 10 mg by mouth daily. alcohol swabs padm Easy Touch Alcohol Prep Pads    PEG 3350-Electrolytes (GO-LYTELY) 236-22.74-6.74 -5.86 gram suspension Starting at noon on day prior to procedures, drink 8 ounces every 30 minutes until all gone and stools are clear. May add flavor packet. fluticasone propionate (FLONASE) 50 mcg/actuation nasal spray     lancets (BD Ultra-Fine II Lancets) 30 gauge misc BD Ultra-Fine II Lancets 30 gauge    Blood-Glucose Meter misc FreeStyle Freedom Lite kit    Blood-Glucose Meter (FreeStyle Freedom Lite) monitoring kit FreeStyle Freedom Lite kit    glucose blood VI test strips (FreeStyle Lite Strips) strip FreeStyle Lite Strips    tramadol HCl/acetaminophen (TRAMADOL-ACETAMINOPHEN PO) tramadol    gabapentin (NEURONTIN) 600 mg tablet Take 600 mg by mouth three (3) times daily. hydrOXYzine HCL (ATARAX) 25 mg tablet hydroxyzine HCl 25 mg tablet    cetirizine (ZYRTEC) 10 mg tablet Take 10 mg by mouth daily. atorvastatin (LIPITOR) 20 mg tablet Take 20 mg by mouth daily. benztropine (COGENTIN) 2 mg tablet Take 2 mg by mouth three (3) times daily.     benzonatate (TESSALON) 100 mg capsule benzonatate 100 mg capsule    albuterol (PROVENTIL HFA, VENTOLIN HFA, PROAIR HFA) 90 mcg/actuation inhaler ProAir HFA 90 mcg/actuation aerosol inhaler    fluticasone propion-salmeteroL (ADVAIR/WIXELA) 100-50 mcg/dose diskus inhaler Advair Diskus 100 mcg-50 mcg/dose powder for inhalation    raNITIdine (ZANTAC) 150 mg tablet ranitidine 150 mg tablet    bisacodyL (DULCOLAX) 5 mg EC tablet Do not crush, chew, or split. Take as directed    montelukast (SINGULAIR) 10 mg tablet Take 10 mg by mouth At bedtime. azithromycin (ZITHROMAX) 250 mg tablet Take 250 mg by mouth. fluticasone (VERAMYST) 27.5 mcg/actuation nasal spray 2 Sprays by Nasal route daily. guaiFENesin-dextromethorphan (ROBITUSSIN DM) 100-10 mg/5 mL syrup Cough Suppressant-Expectorant 10 mg-100 mg/5 mL oral syrup    oxymetazoline (AFRIN) 0.05 % nasal spray     ibuprofen (MOTRIN) 600 mg tablet Take 800 mg by mouth every six (6) hours as needed. guaiFENesin-codeine (Cheratussin AC) 100-10 mg/5 mL solution Cheratussin AC 10 mg-100 mg/5 mL oral liquid    hydrocortisone (Proctozone-HC) 2.5 % rectal cream Proctozone-HC 2.5 % topical cream perineal applicator    diclofenac (Voltaren) 1 % gel Voltaren 1 % topical gel    trospium (SANCTURA) 20 mg tablet Take 20 mg by mouth two (2) times a day. conjugated estrogens (Premarin) 0.625 mg/gram vaginal cream Premarin 0.625 mg/gram vaginal cream    omeprazole (PRILOSEC) 20 mg capsule Take 20 mg by mouth. acetaminophen (TYLENOL) 325 mg tablet Take  by mouth every four (4) hours as needed for Pain.    metFORMIN ER (GLUCOPHAGE XR) 500 mg tablet Take 1 Tab by mouth two (2) times daily (with meals). levothyroxine (SYNTHROID) 25 mcg tablet Take 1 Tab by mouth Daily (before breakfast). Except Sundays    Insulin Needles, Disposable, (ALEIDA PEN NEEDLE) 32 x 5/32 \" ndle Use once daily    furosemide (LASIX) 40 mg tablet Take  by mouth daily. ESOMEPRAZOLE MAGNESIUM (NEXIUM PO) Take  by mouth.    linaclotide (LINZESS) 290 mcg cap Take  by mouth.     traMADol (ULTRAM) 50 mg tablet Take 50 mg by mouth every six (6) hours as needed for Pain.    potassium chloride SA (MICRO-K) 10 mEq capsule Take 10 mEq by mouth daily. OXYCODONE HCL/ACETAMINOPHEN (PERCOCET PO) Take  by mouth as needed. HYDROCODONE/ACETAMINOPHEN (VICODIN PO) Take  by mouth as needed. No current facility-administered medications for this visit. Past Medical History:   Diagnosis Date    Arthritis     Asthma     COVID-19 10/12/2022    Diabetes ([facility] [address]     Hyperlipemia     [facility]        History reviewed. No pertinent surgical history. Family History   Problem Relation Age of Onset    Breast Cancer Mother     Heart Disease Mother     Cancer Father     Heart Disease Father        Social History     Socioeconomic History    Marital status:      Spouse name: Not on file    Number of children: Not on file    Years of education: Not on file    Highest education level: Not on file   Occupational History    Not on file   Tobacco Use    Smoking status: Never    Smokeless tobacco: Never   Substance and Sexual Activity    Alcohol use: Yes     Comment: occasionally    Drug use: No    Sexual activity: Yes     Partners: Male     Birth control/protection: None   Other Topics Concern    Not on file   Social History Narrative    Not on file     Social Determinants of Health     Financial Resource Strain: Not on file   Food Insecurity: Not on file   Transportation Needs: Not on file   Physical Activity: Not on file   Stress: Not on file   Social Connections: Not on file   Intimate Partner Violence: Not on file   Housing Stability: Not on file         ROS   Review of Systems   Constitutional: Negative. HENT: Negative. Eyes: Negative. Respiratory: Negative. Cardiovascular: Negative. Gastrointestinal: Negative. Genitourinary: Negative. Musculoskeletal: Negative. Skin: Negative. Neurological: Negative. Endo/Heme/Allergies: Negative. Psychiatric/Behavioral: Negative.       /68   Ht 5' 3\" (1.6 m) Wt 295 lb 5 oz (134 kg)   BMI 52.31 kg/m²    OBGyn Exam   Constitutional  Appearance: well-nourished, well developed, alert, in no acute distress    HENT  Head and Face: appears normal    Chest  Respiratory Effort: breathing labored    Gastrointestinal  Abdominal Examination: abdomen non-tender to palpation, normal bowel sounds, no masses present    Genitourinary  DEFERRED    Skin  General Inspection: no rash, no lesions identified    Neurologic/Psychiatric  Mental Status:  Orientation: grossly oriented to person, place and time  Mood and Affect: mood normal, affect appropriate    No results found for this visit on 11/14/22. Orders Placed This Encounter    escitalopram oxalate (LEXAPRO) 10 mg tablet     Sig: Take 10 mg by mouth daily. PARoxetine mesylate,menop.sym, (Brisdelle) 7.5 mg cap     Sig: Take 1 Tablet by mouth daily. Dispense:  30 Capsule     Refill:  5     62 YO WITH MULTIPLE MED PROBLEMS  MORBID OBESITY  CHRONIC STEROID USE DUE TO LUNG PROBLEMS  ESTRADIOL< 5, ASYMPTOMATIC    1. Menopausal syndrome  - DISCUSSED HRT AND NON HORMONAL OPTIONS  - TOO RISKY TO TAKE HRT DUE TO MULTIPLE COMORBIDITIES  - PARoxetine mesylate,menop.sym, (Brisdelle) 7.5 mg cap; Take 1 Tablet by mouth daily. Dispense: 30 Capsule;  Refill: 5    RTC IN 3 MONTHS

## 2023-05-23 RX ORDER — CONJUGATED ESTROGENS 0.62 MG/G
CREAM VAGINAL
COMMUNITY

## 2023-05-23 RX ORDER — CETIRIZINE HYDROCHLORIDE 10 MG/1
10 TABLET ORAL DAILY
COMMUNITY

## 2023-05-23 RX ORDER — FLUTICASONE PROPIONATE 50 MCG
SPRAY, SUSPENSION (ML) NASAL
COMMUNITY
Start: 2022-01-11

## 2023-05-23 RX ORDER — BENZONATATE 100 MG/1
CAPSULE ORAL
COMMUNITY

## 2023-05-23 RX ORDER — MONTELUKAST SODIUM 10 MG/1
10 TABLET ORAL NIGHTLY
COMMUNITY

## 2023-05-23 RX ORDER — ACETAMINOPHEN 325 MG/1
TABLET ORAL EVERY 4 HOURS PRN
COMMUNITY

## 2023-05-23 RX ORDER — HYDROXYZINE HYDROCHLORIDE 25 MG/1
TABLET, FILM COATED ORAL
COMMUNITY

## 2023-05-23 RX ORDER — FUROSEMIDE 40 MG/1
TABLET ORAL DAILY
COMMUNITY

## 2023-05-23 RX ORDER — LEVOTHYROXINE SODIUM 0.03 MG/1
25 TABLET ORAL
COMMUNITY
Start: 2014-02-28

## 2023-05-23 RX ORDER — BENZTROPINE MESYLATE 2 MG/1
2 TABLET ORAL 3 TIMES DAILY
COMMUNITY
Start: 2022-04-01

## 2023-05-23 RX ORDER — IBUPROFEN 600 MG/1
800 TABLET ORAL EVERY 6 HOURS PRN
COMMUNITY

## 2023-05-23 RX ORDER — ALBUTEROL SULFATE 90 UG/1
AEROSOL, METERED RESPIRATORY (INHALATION)
COMMUNITY

## 2023-05-23 RX ORDER — POTASSIUM CHLORIDE 750 MG/1
10 CAPSULE, EXTENDED RELEASE ORAL DAILY
COMMUNITY

## 2023-05-23 RX ORDER — RANITIDINE 150 MG/1
TABLET ORAL
COMMUNITY

## 2023-05-23 RX ORDER — TROSPIUM CHLORIDE 20 MG/1
20 TABLET, FILM COATED ORAL 2 TIMES DAILY
COMMUNITY

## 2023-05-23 RX ORDER — METFORMIN HYDROCHLORIDE 500 MG/1
500 TABLET, EXTENDED RELEASE ORAL 2 TIMES DAILY WITH MEALS
COMMUNITY
Start: 2014-02-28

## 2023-05-23 RX ORDER — BISACODYL 5 MG/1
TABLET, DELAYED RELEASE ORAL
COMMUNITY
Start: 2022-06-16

## 2023-05-23 RX ORDER — GABAPENTIN 600 MG/1
600 TABLET ORAL 3 TIMES DAILY
COMMUNITY

## 2023-05-23 RX ORDER — ESCITALOPRAM OXALATE 10 MG/1
10 TABLET ORAL DAILY
COMMUNITY

## 2023-05-23 RX ORDER — OXYMETAZOLINE HYDROCHLORIDE 0.05 G/100ML
SPRAY NASAL
COMMUNITY
Start: 2021-09-01

## 2023-05-23 RX ORDER — PAROXETINE 7.5 MG/1
1 CAPSULE ORAL DAILY
COMMUNITY
Start: 2022-11-14

## 2023-05-23 RX ORDER — TRAMADOL HYDROCHLORIDE 50 MG/1
50 TABLET ORAL EVERY 6 HOURS PRN
COMMUNITY

## 2023-05-23 RX ORDER — ATORVASTATIN CALCIUM 20 MG/1
20 TABLET, FILM COATED ORAL DAILY
COMMUNITY

## 2023-05-23 RX ORDER — GUAIFENESIN DEXTROMETHORPHAN HYDROBROMIDE ORAL SOLUTION 10; 100 MG/5ML; MG/5ML
SOLUTION ORAL
COMMUNITY

## 2023-05-23 RX ORDER — GUAIFENESIN AND CODEINE PHOSPHATE 100; 10 MG/5ML; MG/5ML
SOLUTION ORAL
COMMUNITY

## 2023-05-31 ENCOUNTER — HOSPITAL ENCOUNTER (OUTPATIENT)
Facility: HOSPITAL | Age: 63
Discharge: HOME | End: 2023-05-31
Attending: INTERNAL MEDICINE | Admitting: INTERNAL MEDICINE
Payer: OTHER GOVERNMENT

## 2023-05-31 VITALS
HEART RATE: 83 BPM | OXYGEN SATURATION: 94 % | TEMPERATURE: 98.2 F | SYSTOLIC BLOOD PRESSURE: 121 MMHG | DIASTOLIC BLOOD PRESSURE: 68 MMHG | RESPIRATION RATE: 18 BRPM

## 2023-05-31 DIAGNOSIS — R07.9 CHEST PAIN: ICD-10-CM

## 2023-05-31 LAB
ALBUMIN SERPL-MCNC: 3.8 G/DL (ref 3.5–5)
ALBUMIN/GLOB SERPL: 1 (ref 1.1–2.2)
ALP SERPL-CCNC: 97 U/L (ref 45–117)
ALT SERPL-CCNC: 36 U/L (ref 12–78)
ANION GAP SERPL CALC-SCNC: 3 MMOL/L (ref 5–15)
APTT PPP: 29.8 SEC (ref 21.2–34.1)
AST SERPL W P-5'-P-CCNC: 21 U/L (ref 15–37)
BILIRUB SERPL-MCNC: 0.4 MG/DL (ref 0.2–1)
BUN SERPL-MCNC: 12 MG/DL (ref 6–20)
BUN/CREAT SERPL: 17 (ref 12–20)
CA-I BLD-MCNC: 9.4 MG/DL (ref 8.5–10.1)
CHLORIDE SERPL-SCNC: 109 MMOL/L (ref 97–108)
CO2 SERPL-SCNC: 26 MMOL/L (ref 21–32)
CREAT SERPL-MCNC: 0.72 MG/DL (ref 0.55–1.02)
ERYTHROCYTE [DISTWIDTH] IN BLOOD BY AUTOMATED COUNT: 15.7 % (ref 11.5–14.5)
GLOBULIN SER CALC-MCNC: 3.8 G/DL (ref 2–4)
GLUCOSE BLD STRIP.AUTO-MCNC: 125 MG/DL (ref 65–100)
GLUCOSE BLD STRIP.AUTO-MCNC: 140 MG/DL (ref 65–100)
GLUCOSE SERPL-MCNC: 149 MG/DL (ref 65–100)
HCT VFR BLD AUTO: 41.1 % (ref 35–47)
HGB BLD-MCNC: 12.7 G/DL (ref 11.5–16)
INR PPP: 1 (ref 0.9–1.1)
MCH RBC QN AUTO: 24.6 PG (ref 26–34)
MCHC RBC AUTO-ENTMCNC: 30.9 G/DL (ref 30–36.5)
MCV RBC AUTO: 79.7 FL (ref 80–99)
NRBC # BLD: 0 K/UL (ref 0–0.01)
NRBC BLD-RTO: 0 PER 100 WBC
PERFORMED BY:: ABNORMAL
PERFORMED BY:: ABNORMAL
PLATELET # BLD AUTO: 278 K/UL (ref 150–400)
PMV BLD AUTO: 10.3 FL (ref 8.9–12.9)
POTASSIUM SERPL-SCNC: 4.4 MMOL/L (ref 3.5–5.1)
PROT SERPL-MCNC: 7.6 G/DL (ref 6.4–8.2)
PROTHROMBIN TIME: 13.4 SEC (ref 11.9–14.6)
RBC # BLD AUTO: 5.16 M/UL (ref 3.8–5.2)
SODIUM SERPL-SCNC: 138 MMOL/L (ref 136–145)
THERAPEUTIC RANGE: NORMAL SEC (ref 82–109)
TSH SERPL DL<=0.05 MIU/L-ACNC: 0.79 UIU/ML (ref 0.36–3.74)
WBC # BLD AUTO: 10 K/UL (ref 3.6–11)

## 2023-05-31 PROCEDURE — 7100000010 HC PHASE II RECOVERY - FIRST 15 MIN: Performed by: INTERNAL MEDICINE

## 2023-05-31 PROCEDURE — 7100000001 HC PACU RECOVERY - ADDTL 15 MIN: Performed by: INTERNAL MEDICINE

## 2023-05-31 PROCEDURE — 6370000000 HC RX 637 (ALT 250 FOR IP): Performed by: INTERNAL MEDICINE

## 2023-05-31 PROCEDURE — 36415 COLL VENOUS BLD VENIPUNCTURE: CPT

## 2023-05-31 PROCEDURE — C1894 INTRO/SHEATH, NON-LASER: HCPCS | Performed by: INTERNAL MEDICINE

## 2023-05-31 PROCEDURE — 80053 COMPREHEN METABOLIC PANEL: CPT

## 2023-05-31 PROCEDURE — 2709999900 HC NON-CHARGEABLE SUPPLY: Performed by: INTERNAL MEDICINE

## 2023-05-31 PROCEDURE — 85730 THROMBOPLASTIN TIME PARTIAL: CPT

## 2023-05-31 PROCEDURE — 7100000011 HC PHASE II RECOVERY - ADDTL 15 MIN: Performed by: INTERNAL MEDICINE

## 2023-05-31 PROCEDURE — 82962 GLUCOSE BLOOD TEST: CPT

## 2023-05-31 PROCEDURE — 93458 L HRT ARTERY/VENTRICLE ANGIO: CPT | Performed by: INTERNAL MEDICINE

## 2023-05-31 PROCEDURE — 84443 ASSAY THYROID STIM HORMONE: CPT

## 2023-05-31 PROCEDURE — 99152 MOD SED SAME PHYS/QHP 5/>YRS: CPT | Performed by: INTERNAL MEDICINE

## 2023-05-31 PROCEDURE — 85027 COMPLETE CBC AUTOMATED: CPT

## 2023-05-31 PROCEDURE — 6360000004 HC RX CONTRAST MEDICATION: Performed by: INTERNAL MEDICINE

## 2023-05-31 PROCEDURE — 85610 PROTHROMBIN TIME: CPT

## 2023-05-31 PROCEDURE — 76937 US GUIDE VASCULAR ACCESS: CPT | Performed by: INTERNAL MEDICINE

## 2023-05-31 PROCEDURE — 6360000002 HC RX W HCPCS: Performed by: INTERNAL MEDICINE

## 2023-05-31 PROCEDURE — C1769 GUIDE WIRE: HCPCS | Performed by: INTERNAL MEDICINE

## 2023-05-31 PROCEDURE — 2500000003 HC RX 250 WO HCPCS: Performed by: INTERNAL MEDICINE

## 2023-05-31 PROCEDURE — 7100000000 HC PACU RECOVERY - FIRST 15 MIN: Performed by: INTERNAL MEDICINE

## 2023-05-31 RX ORDER — ACETAMINOPHEN 325 MG/1
650 TABLET ORAL EVERY 4 HOURS PRN
Status: DISCONTINUED | OUTPATIENT
Start: 2023-05-31 | End: 2023-05-31 | Stop reason: HOSPADM

## 2023-05-31 RX ORDER — FLUTICASONE PROPIONATE AND SALMETEROL 100; 50 UG/1; UG/1
1 POWDER RESPIRATORY (INHALATION) EVERY 12 HOURS
COMMUNITY

## 2023-05-31 RX ORDER — DIPHENHYDRAMINE HYDROCHLORIDE 50 MG/ML
INJECTION INTRAMUSCULAR; INTRAVENOUS PRN
Status: DISCONTINUED | OUTPATIENT
Start: 2023-05-31 | End: 2023-05-31 | Stop reason: HOSPADM

## 2023-05-31 RX ORDER — SODIUM CHLORIDE 9 MG/ML
INJECTION, SOLUTION INTRAVENOUS PRN
Status: DISCONTINUED | OUTPATIENT
Start: 2023-05-31 | End: 2023-05-31 | Stop reason: HOSPADM

## 2023-05-31 RX ORDER — ACETAMINOPHEN 325 MG/1
650 TABLET ORAL
OUTPATIENT
Start: 2023-05-31 | End: 2023-06-01

## 2023-05-31 RX ORDER — VERAPAMIL HYDROCHLORIDE 2.5 MG/ML
INJECTION, SOLUTION INTRAVENOUS PRN
Status: DISCONTINUED | OUTPATIENT
Start: 2023-05-31 | End: 2023-05-31 | Stop reason: HOSPADM

## 2023-05-31 RX ORDER — MIDAZOLAM HYDROCHLORIDE 1 MG/ML
INJECTION INTRAMUSCULAR; INTRAVENOUS PRN
Status: DISCONTINUED | OUTPATIENT
Start: 2023-05-31 | End: 2023-05-31 | Stop reason: HOSPADM

## 2023-05-31 RX ORDER — SODIUM CHLORIDE 9 MG/ML
INJECTION, SOLUTION INTRAVENOUS CONTINUOUS
Status: DISCONTINUED | OUTPATIENT
Start: 2023-05-31 | End: 2023-05-31 | Stop reason: HOSPADM

## 2023-05-31 RX ORDER — METHYLPREDNISOLONE SODIUM SUCCINATE 125 MG/2ML
INJECTION, POWDER, LYOPHILIZED, FOR SOLUTION INTRAMUSCULAR; INTRAVENOUS PRN
Status: DISCONTINUED | OUTPATIENT
Start: 2023-05-31 | End: 2023-05-31 | Stop reason: HOSPADM

## 2023-05-31 RX ORDER — FENTANYL CITRATE 50 UG/ML
INJECTION, SOLUTION INTRAMUSCULAR; INTRAVENOUS PRN
Status: DISCONTINUED | OUTPATIENT
Start: 2023-05-31 | End: 2023-05-31 | Stop reason: HOSPADM

## 2023-05-31 RX ORDER — NITROGLYCERIN 20 MG/100ML
INJECTION INTRAVENOUS PRN
Status: DISCONTINUED | OUTPATIENT
Start: 2023-05-31 | End: 2023-05-31 | Stop reason: HOSPADM

## 2023-05-31 RX ADMIN — ACETAMINOPHEN 650 MG: 325 TABLET, FILM COATED ORAL at 16:36

## 2023-05-31 ASSESSMENT — PAIN DESCRIPTION - LOCATION
LOCATION: HEAD
LOCATION: HEAD

## 2023-05-31 ASSESSMENT — PAIN DESCRIPTION - DESCRIPTORS
DESCRIPTORS: ACHING
DESCRIPTORS: ACHING

## 2023-05-31 ASSESSMENT — PAIN SCALES - GENERAL
PAINLEVEL_OUTOF10: 6
PAINLEVEL_OUTOF10: 6

## 2023-05-31 ASSESSMENT — PAIN - FUNCTIONAL ASSESSMENT: PAIN_FUNCTIONAL_ASSESSMENT: NONE - DENIES PAIN

## 2023-05-31 NOTE — PERIOP NOTE
Patient alert and oriented x4, VS stable, 4/10 pain at discharge. Discharge instructions/education provided to , Glenny Benjamin, he verbalized understanding and had no questions. Patient was discharged in wheelchair to main entrance of hospital with  to home via private vehicle.

## 2023-05-31 NOTE — DISCHARGE INSTRUCTIONS
Hold metformin 48 hours after cardiac catheterization on 5-31-23. Okay to resume metformin on Friday June 02, 2023 after 1:45 p.m. [facility]  Cardiac Catheterization Department  [address] [facility], [facility] [facility]  [phone]        Coronary Angiogram: What to Expect at [address] [facility]  A coronary angiogram is a test to examine the large blood vessels of your heart (coronary arteries). The doctor inserted a thin, flexible tube (catheter into a blood vessel in your groin or wrist.  Your groin or wrist may have a bruise and feel sore for a few days after the procedure. You can do light activities around the house. But do not do anything strenuous until your doctor says it is ok. This may be for several days. This care sheet gives you a general idea about how long it will take for you to recover. But each person recovers at a different pace. Follow the steps below to feel better as quickly as possible. How can you care for yourself at home? Activity  If the doctor gave you a sedative: For 24 hours, don't do anything that requires attention to detail, such as going to work, making important decisions, or signing any legal documents. It takes time for the medicine's effects to completely wear off. For your safety, do not drive or operate any machinery that could be dangerous. Wait until the medicine wears off and you can think clearly and react easily. Do not do any strenuous exercise and do not lift, pull, or push anything heavier than 5 pounds (approximately the weight of 1 gallon of milk) until your doctor says it is ok. This may be for several days. You can walk around the house and do light activity, such as cooking. If the catheter was placed in your groin and you must use stairs, just go up and down slowly in as few trips as possible for the first couple of days.   If the catheter was placed in your wrist, do not bend your wrist deeply for the first

## 2023-05-31 NOTE — PERIOP NOTE
Notified Dr Behzad Stephens about telemetry monitoring order for 24 hours, he stated to discontinue order since patient is being discharged to home. New order received for tylenol for slight headache.

## 2023-12-22 ENCOUNTER — TELEPHONE (OUTPATIENT)
Age: 63
End: 2023-12-22

## 2024-01-03 NOTE — TELEPHONE ENCOUNTER
Spoke with patient who contacted the office reports vaginal bleeding.  She is due for her annual appt scheduled.

## 2024-01-05 ENCOUNTER — OFFICE VISIT (OUTPATIENT)
Age: 64
End: 2024-01-05

## 2024-01-05 VITALS
SYSTOLIC BLOOD PRESSURE: 154 MMHG | WEIGHT: 283.25 LBS | BODY MASS INDEX: 50.19 KG/M2 | HEIGHT: 63 IN | DIASTOLIC BLOOD PRESSURE: 79 MMHG

## 2024-01-05 DIAGNOSIS — Z11.51 SCREENING FOR HUMAN PAPILLOMAVIRUS: ICD-10-CM

## 2024-01-05 DIAGNOSIS — Z01.419 PAP SMEAR, AS PART OF ROUTINE GYNECOLOGICAL EXAMINATION: Primary | ICD-10-CM

## 2024-01-05 DIAGNOSIS — N95.0 POSTMENOPAUSAL BLEEDING: ICD-10-CM

## 2024-01-05 DIAGNOSIS — Z00.01 ANNUAL VISIT FOR GENERAL ADULT MEDICAL EXAMINATION WITH ABNORMAL FINDINGS: ICD-10-CM

## 2024-01-05 RX ORDER — FLUTICASONE PROPIONATE AND SALMETEROL 500; 50 UG/1; UG/1
POWDER RESPIRATORY (INHALATION)
COMMUNITY
Start: 2023-05-19

## 2024-01-05 RX ORDER — BACLOFEN 10 MG/1
5 TABLET ORAL
COMMUNITY
Start: 2023-10-02

## 2024-01-05 RX ORDER — NALOXONE HYDROCHLORIDE 4 MG/.1ML
4 SPRAY NASAL
COMMUNITY
Start: 2023-05-09

## 2024-01-05 RX ORDER — MELATONIN
1 DAILY
COMMUNITY
Start: 2023-05-09

## 2024-01-05 RX ORDER — FLUTICASONE PROPIONATE AND SALMETEROL 500; 50 UG/1; UG/1
1 POWDER RESPIRATORY (INHALATION) 2 TIMES DAILY
COMMUNITY
Start: 2021-06-28

## 2024-01-05 RX ORDER — BUSPIRONE HYDROCHLORIDE 5 MG/1
1 TABLET ORAL 2 TIMES DAILY
COMMUNITY
Start: 2023-05-09 | End: 2024-05-08

## 2024-01-05 RX ORDER — OMEPRAZOLE 40 MG/1
40 CAPSULE, DELAYED RELEASE ORAL
COMMUNITY
Start: 2023-10-02

## 2024-01-05 RX ORDER — TORSEMIDE 20 MG/1
TABLET ORAL
COMMUNITY
Start: 2023-06-26 | End: 2024-11-18

## 2024-01-05 RX ORDER — BLOOD-GLUCOSE METER
KIT MISCELLANEOUS
COMMUNITY
Start: 2022-01-06

## 2024-01-05 RX ORDER — LIDOCAINE 50 MG/G
5 PATCH TOPICAL
COMMUNITY
Start: 2023-04-10 | End: 2024-04-10

## 2024-01-05 RX ORDER — POTASSIUM CHLORIDE 750 MG/1
TABLET, FILM COATED, EXTENDED RELEASE ORAL
COMMUNITY
Start: 2023-10-09

## 2024-01-05 RX ORDER — ATORVASTATIN CALCIUM 40 MG/1
TABLET, FILM COATED ORAL
COMMUNITY
Start: 2023-10-19

## 2024-01-05 RX ORDER — LIDOCAINE 50 MG/G
PATCH TOPICAL
COMMUNITY
Start: 2023-10-02

## 2024-01-05 RX ORDER — DULAGLUTIDE 1.5 MG/.5ML
1.5 INJECTION, SOLUTION SUBCUTANEOUS
COMMUNITY
Start: 2023-07-20

## 2024-01-05 NOTE — PROGRESS NOTES
lymphadenopathy.    Chest  Respiratory Effort: breathing labored  Auscultation: normal breath sounds    Cardiovascular  Heart:  Auscultation: regular rate and rhythm without murmur    Gastrointestinal  Abdominal Examination: abdomen non-tender to palpation, normal bowel sounds, no masses present  Liver and spleen: no hepatomegaly present, spleen not palpable  Hernias: no hernias identified    Genitourinary (LIMITED EXAM DUE TO BODY HABITUS)  External Genitalia: normal appearance for age, no discharge present, no tenderness present, no inflammatory lesions present, no masses present, no atrophy present  Vagina: normal vaginal vault without central or paravaginal defects, no discharge present, no inflammatory lesions present, no masses present  Bladder: non-tender to palpation  Urethra: appears normal  Cervix: normal   Uterus: normal size, shape and consistency  Adnexa: no adnexal tenderness present, no adnexal masses present  Perineum: perineum within normal limits, no evidence of trauma, no rashes or skin lesions present  Anus: anus within normal limits, no hemorrhoids present  Inguinal Lymph Nodes: no lymphadenopathy present    Skin  General Inspection: no rash, no lesions identified    Neurologic/Psychiatric  Mental Status:  Orientation: grossly oriented to person, place and time  Mood and Affect: mood normal, affect appropriate    Reading Physician Reading Date Result Priority   German Carreno MD  [phone] 8/25/2022 Routine     Narrative & Impression  Formatting of this result is different from the original.  Transvaginal Study for PMB      Difficult study due to pt body habitus and bowel gas.      Uterus: Anteverted 6.1x4.4x4cm heterogeneous, calcifications seen throughout.   Anterior fibroid measuring 1.4x0.8x1.9cm.     Cervix: 2.4cm   Endometrial strip seen 0.3cm, not well seen due to heterogeneous uterus.      Rt and Lt ovaries obscured by bowel gas.      No FF seen in CDS     IMPRESSION:  :  Normal size

## 2024-01-08 LAB
., LABCORP: NORMAL
CYTOLOGIST CVX/VAG CYTO: NORMAL
CYTOLOGY CVX/VAG DOC CYTO: NORMAL
CYTOLOGY CVX/VAG DOC THIN PREP: NORMAL
DX ICD CODE: NORMAL
Lab: NORMAL
OTHER STN SPEC: NORMAL
STAT OF ADQ CVX/VAG CYTO-IMP: NORMAL

## 2024-01-30 ENCOUNTER — OFFICE VISIT (OUTPATIENT)
Age: 64
End: 2024-01-30
Payer: OTHER GOVERNMENT

## 2024-01-30 VITALS
HEIGHT: 63 IN | DIASTOLIC BLOOD PRESSURE: 65 MMHG | SYSTOLIC BLOOD PRESSURE: 127 MMHG | BODY MASS INDEX: 50.04 KG/M2 | WEIGHT: 282.38 LBS

## 2024-01-30 DIAGNOSIS — N95.0 POSTMENOPAUSAL BLEEDING: Primary | ICD-10-CM

## 2024-01-30 DIAGNOSIS — Z13.820 SCREENING FOR OSTEOPOROSIS: ICD-10-CM

## 2024-01-30 PROCEDURE — 99213 OFFICE O/P EST LOW 20 MIN: CPT | Performed by: OBSTETRICS & GYNECOLOGY

## 2024-01-30 NOTE — PROGRESS NOTES
Zandra Maria is a 63 y.o. female who presents today for the following:  Chief Complaint   Patient presents with    Results     Pt presents for ultrasound results//MIREILLEeeley         Allergies   Allergen Reactions    Latex Itching and Rash     Reaction Type: Allergy    Other Reaction(s): Itching purpura, Rash, Itching purpura, Rash    Reaction Type: Allergy  Reaction Type: Allergy      Reaction Type: Allergy    Reaction Type: Allergy      Reaction Type: Allergy    Aspirin Anaphylaxis, Other (See Comments), Swelling and Shortness Of Breath     inflammation    Other reaction(s): Unknown    Reaction Type: Allergy; Reaction(s): inflammation/rash    Other Reaction(s): Other (see comments), Unknown    Reaction Type: Allergy; Reaction(s): inflammation/rash   inflammation    Reaction Type: Allergy; Reaction(s): inflammation/rash inflammation      inflammation Other reaction(s): Unknown Reaction Type: Allergy; Reaction(s): inflammation/rash inflammation    Iodine Other (See Comments) and Shortness Of Breath    Oxycodone Shortness Of Breath    Sertraline Itching, Swelling and Shortness Of Breath     Other Reaction(s): Rash, Swelling, Cough, Rash, Swelling, Cough, Rash, Swelling, Cough    Hydrocodone-Acetaminophen Swelling     Other Reaction(s): Tongue swelling    Iodinated Contrast Media Other (See Comments)    Oxycodone Hcl     Prednisone & Diphenhydramine        Current Outpatient Medications   Medication Sig Dispense Refill    fluticasone-salmeterol (ADVAIR DISKUS) 500-50 MCG/ACT AEPB diskus inhaler by NOT APPLICABLE route      ALBUTEROL IN See Instructions, # 30 mL, 0 total refill(s), Hard Stop      baclofen (LIORESAL) 10 MG tablet Take 0.5 tablets by mouth      busPIRone (BUSPAR) 5 MG tablet Take 1 tablet by mouth 2 times daily      TRULICITY 1.5 MG/0.5ML SC injection Inject 0.5 mLs into the skin      vitamin D3 (CHOLECALCIFEROL) 25 MCG (1000 UT) TABS tablet Take 1 tablet by mouth daily      fluticasone-salmeterol

## 2024-02-06 ENCOUNTER — HOSPITAL ENCOUNTER (OUTPATIENT)
Facility: HOSPITAL | Age: 64
Discharge: HOME | End: 2024-02-09
Attending: OBSTETRICS & GYNECOLOGY
Payer: OTHER GOVERNMENT

## 2024-02-06 DIAGNOSIS — Z13.820 SCREENING FOR OSTEOPOROSIS: ICD-10-CM

## 2024-02-06 PROCEDURE — 77080 DXA BONE DENSITY AXIAL: CPT

## 2024-02-27 ENCOUNTER — HOSPITAL ENCOUNTER (EMERGENCY)
Facility: HOSPITAL | Age: 64
Discharge: HOME | End: 2024-02-27
Attending: EMERGENCY MEDICINE
Payer: OTHER GOVERNMENT

## 2024-02-27 ENCOUNTER — APPOINTMENT (OUTPATIENT)
Facility: HOSPITAL | Age: 64
End: 2024-02-27
Payer: OTHER GOVERNMENT

## 2024-02-27 VITALS
RESPIRATION RATE: 20 BRPM | TEMPERATURE: 97.9 F | WEIGHT: 288 LBS | HEART RATE: 83 BPM | DIASTOLIC BLOOD PRESSURE: 70 MMHG | OXYGEN SATURATION: 98 % | HEIGHT: 64 IN | BODY MASS INDEX: 49.17 KG/M2 | SYSTOLIC BLOOD PRESSURE: 124 MMHG
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
## 2024-02-27 DIAGNOSIS — R07.2 SUBSTERNAL CHEST PAIN: Primary | ICD-10-CM

## 2024-02-27 LAB
ANION GAP SERPL CALC-SCNC: 2 MMOL/L (ref 5–15)
BASOPHILS # BLD: 0 K/UL (ref 0–0.1)
BASOPHILS NFR BLD: 0 % (ref 0–1)
BUN SERPL-MCNC: 10 MG/DL (ref 6–20)
BUN/CREAT SERPL: 16 (ref 12–20)
CA-I BLD-MCNC: 9.3 MG/DL (ref 8.5–10.1)
CHLORIDE SERPL-SCNC: 110 MMOL/L (ref 97–108)
CO2 SERPL-SCNC: 28 MMOL/L (ref 21–32)
CREAT SERPL-MCNC: 0.61 MG/DL (ref 0.55–1.02)
DIFFERENTIAL METHOD BLD: ABNORMAL
EKG ATRIAL RATE: 70 BPM
EKG DIAGNOSIS: NORMAL
EKG P AXIS: 40 DEGREES
EKG P-R INTERVAL: 150 MS
EKG Q-T INTERVAL: 392 MS
EKG QRS DURATION: 82 MS
EKG QTC CALCULATION (BAZETT): 423 MS
EKG R AXIS: -4 DEGREES
EKG T AXIS: 6 DEGREES
EKG VENTRICULAR RATE: 70 BPM
EOSINOPHIL # BLD: 0.1 K/UL (ref 0–0.4)
EOSINOPHIL NFR BLD: 1 % (ref 0–7)
ERYTHROCYTE [DISTWIDTH] IN BLOOD BY AUTOMATED COUNT: 15.8 % (ref 11.5–14.5)
GLUCOSE BLD STRIP.AUTO-MCNC: 82 MG/DL (ref 65–100)
GLUCOSE SERPL-MCNC: 102 MG/DL (ref 65–100)
HCT VFR BLD AUTO: 37.7 % (ref 35–47)
HGB BLD-MCNC: 11.7 G/DL (ref 11.5–16)
IMM GRANULOCYTES # BLD AUTO: 0.1 K/UL (ref 0–0.04)
IMM GRANULOCYTES NFR BLD AUTO: 1 % (ref 0–0.5)
LYMPHOCYTES # BLD: 1.6 K/UL (ref 0.8–3.5)
LYMPHOCYTES NFR BLD: 27 % (ref 12–49)
MAGNESIUM SERPL-MCNC: 2 MG/DL (ref 1.6–2.4)
MCH RBC QN AUTO: 25.2 PG (ref 26–34)
MCHC RBC AUTO-ENTMCNC: 31 G/DL (ref 30–36.5)
MCV RBC AUTO: 81.1 FL (ref 80–99)
MONOCYTES # BLD: 0.3 K/UL (ref 0–1)
MONOCYTES NFR BLD: 6 % (ref 5–13)
NEUTS SEG # BLD: 4 K/UL (ref 1.8–8)
NEUTS SEG NFR BLD: 65 % (ref 32–75)
NRBC # BLD: 0 K/UL (ref 0–0.01)
NRBC BLD-RTO: 0 PER 100 WBC
PERFORMED BY:: NORMAL
PLATELET # BLD AUTO: 294 K/UL (ref 150–400)
PMV BLD AUTO: 10 FL (ref 8.9–12.9)
POTASSIUM SERPL-SCNC: 3.9 MMOL/L (ref 3.5–5.1)
RBC # BLD AUTO: 4.65 M/UL (ref 3.8–5.2)
SODIUM SERPL-SCNC: 140 MMOL/L (ref 136–145)
TROPONIN I SERPL HS-MCNC: 6 NG/L (ref 0–51)
WBC # BLD AUTO: 6 K/UL (ref 3.6–11)

## 2024-02-27 PROCEDURE — 83735 ASSAY OF MAGNESIUM: CPT

## 2024-02-27 PROCEDURE — 84484 ASSAY OF TROPONIN QUANT: CPT

## 2024-02-27 PROCEDURE — 6370000000 HC RX 637 (ALT 250 FOR IP): Performed by: EMERGENCY MEDICINE

## 2024-02-27 PROCEDURE — 71046 X-RAY EXAM CHEST 2 VIEWS: CPT

## 2024-02-27 PROCEDURE — 93005 ELECTROCARDIOGRAM TRACING: CPT | Performed by: EMERGENCY MEDICINE

## 2024-02-27 PROCEDURE — 82962 GLUCOSE BLOOD TEST: CPT

## 2024-02-27 PROCEDURE — 80048 BASIC METABOLIC PNL TOTAL CA: CPT

## 2024-02-27 PROCEDURE — 73630 X-RAY EXAM OF FOOT: CPT

## 2024-02-27 PROCEDURE — 36415 COLL VENOUS BLD VENIPUNCTURE: CPT

## 2024-02-27 PROCEDURE — 99285 EMERGENCY DEPT VISIT HI MDM: CPT

## 2024-02-27 PROCEDURE — 85025 COMPLETE CBC W/AUTO DIFF WBC: CPT

## 2024-02-27 RX ORDER — DIAZEPAM 5 MG/1
5 TABLET ORAL ONCE
Status: COMPLETED | OUTPATIENT
Start: 2024-02-27 | End: 2024-02-27

## 2024-02-27 RX ADMIN — DIAZEPAM 5 MG: 5 TABLET ORAL at 13:48

## 2024-02-27 ASSESSMENT — PAIN SCALES - GENERAL
PAINLEVEL_OUTOF10: 10
PAINLEVEL_OUTOF10: 3
PAINLEVEL_OUTOF10: 8
PAINLEVEL_OUTOF10: 6

## 2024-02-27 ASSESSMENT — PAIN - FUNCTIONAL ASSESSMENT
PAIN_FUNCTIONAL_ASSESSMENT: 0-10

## 2024-02-27 ASSESSMENT — HEART SCORE: ECG: 0

## 2024-02-27 ASSESSMENT — LIFESTYLE VARIABLES
HOW MANY STANDARD DRINKS CONTAINING ALCOHOL DO YOU HAVE ON A TYPICAL DAY: PATIENT DOES NOT DRINK
HOW OFTEN DO YOU HAVE A DRINK CONTAINING ALCOHOL: NEVER

## 2024-02-27 ASSESSMENT — PAIN DESCRIPTION - LOCATION: LOCATION: CHEST;ARM

## 2024-02-27 ASSESSMENT — PAIN DESCRIPTION - ORIENTATION: ORIENTATION: LEFT

## 2024-02-27 NOTE — DISCHARGE INSTRUCTIONS
Thank you!  Thank you for allowing me to care for you in the emergency department. It is my goal to provide you with excellent care.  Please fill out the survey that will come to you by mail or email since we listen to your feedback!     Below you will find a list of your tests from today's visit.  Should you have any questions, please do not hesitate to call the emergency department.    Labs  Recent Results (from the past 12 hour(s))   EKG 12 Lead    Collection Time: 02/27/24  1:20 PM   Result Value Ref Range    Ventricular Rate 70 BPM    Atrial Rate 70 BPM    P-R Interval 150 ms    QRS Duration 82 ms    Q-T Interval 392 ms    QTc Calculation (Bazett) 423 ms    P Axis 40 degrees    R Axis -4 degrees    T Axis 6 degrees    Diagnosis       Normal sinus rhythm  Moderate voltage criteria for LVH, may be normal variant  Borderline ECG  Confirmed by Prashant Deluca ([id number]) on 2/27/2024 3:24:37 PM     CBC with Auto Differential    Collection Time: 02/27/24  1:44 PM   Result Value Ref Range    WBC 6.0 3.6 - 11.0 K/uL    RBC 4.65 3.80 - 5.20 M/uL    Hemoglobin 11.7 11.5 - 16.0 g/dL    Hematocrit 37.7 35.0 - 47.0 %    MCV 81.1 80.0 - 99.0 FL    MCH 25.2 (L) 26.0 - 34.0 PG    MCHC 31.0 30.0 - 36.5 g/dL    RDW 15.8 (H) 11.5 - 14.5 %    Platelets 294 150 - 400 K/uL    MPV 10.0 8.9 - 12.9 FL    Nucleated RBCs 0.0 0.0  WBC    nRBC 0.00 0.00 - 0.01 K/uL    Neutrophils % 65 32 - 75 %    Lymphocytes % 27 12 - 49 %    Monocytes % 6 5 - 13 %    Eosinophils % 1 0 - 7 %    Basophils % 0 0 - 1 %    Immature Granulocytes 1 (H) 0 - 0.5 %    Neutrophils Absolute 4.0 1.8 - 8.0 K/UL    Lymphocytes Absolute 1.6 0.8 - 3.5 K/UL    Monocytes Absolute 0.3 0.0 - 1.0 K/UL    Eosinophils Absolute 0.1 0.0 - 0.4 K/UL    Basophils Absolute 0.0 0.0 - 0.1 K/UL    Absolute Immature Granulocyte 0.1 (H) 0.00 - 0.04 K/UL    Differential Type AUTOMATED     BMP    Collection Time: 02/27/24  1:44 PM   Result Value Ref Range    Sodium 140 136 - 145

## 2024-02-27 NOTE — ED PROVIDER NOTES
40 MG delayed release capsule Take 1 capsule by mouth      torsemide (DEMADEX) 20 MG tablet See Instructions, Take 1 tablet by mouth, # 90 EA, 3 total refill(s), Acute, Take 1 tablet by mouth, Pharmacy: [facility]      atorvastatin (LIPITOR) 40 MG tablet       potassium chloride (KLOR-CON) 10 MEQ extended release tablet       acetaminophen (TYLENOL) 325 MG tablet Take by mouth every 4 hours as needed      albuterol sulfate HFA (PROVENTIL;VENTOLIN;PROAIR) 108 (90 Base) MCG/ACT inhaler ProAir HFA 90 mcg/actuation aerosol inhaler      cetirizine (ZYRTEC) 10 MG tablet Take 1 tablet by mouth daily      linaclotide (LINZESS) 290 MCG CAPS capsule Take by mouth      montelukast (SINGULAIR) 10 MG tablet Take 1 tablet by mouth nightly      traMADol (ULTRAM) 50 MG tablet Take 1 tablet by mouth every 6 hours as needed.      trospium (SANCTURA) 20 MG tablet Take 1 tablet by mouth 2 times daily         Social Determinants of Health:   Social Determinants of Health     Tobacco Use: Low Risk  (1/30/2024)    Patient History     Smoking Tobacco Use: Never     Smokeless Tobacco Use: Never     Passive Exposure: Not on file   Alcohol Use: Not At Risk (2/27/2024)    AUDIT-C     Frequency of Alcohol Consumption: Never     Average Number of Drinks: Patient does not drink     Frequency of Binge Drinking: Never   Financial Resource Strain: Not on file   Food Insecurity: Not on file   Transportation Needs: Not on file   Physical Activity: Not on file   Stress: Not on file   Social Connections: Not on file   Intimate Partner Violence: Not on file   Depression: Not at risk (10/28/2022)    PHQ-2     PHQ-2 Score: 0   Housing Stability: Not on file   Interpersonal Safety: Not on file   Utilities: Not on file       PHYSICAL EXAM   Physical Exam  Physical Exam  Constitutional:       General: No acute distress.     Appearance: Normal appearance. Not toxic-appearing.   HENT:      Head: Normocephalic and atraumatic.      Nose: Nose
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
## 2024-04-24 ENCOUNTER — TRANSCRIBE ORDERS (OUTPATIENT)
Facility: HOSPITAL | Age: 64
End: 2024-04-24

## 2024-04-24 DIAGNOSIS — M51.36 DDD (DEGENERATIVE DISC DISEASE), LUMBAR: Primary | ICD-10-CM

## 2024-05-01 ENCOUNTER — HOSPITAL ENCOUNTER (OUTPATIENT)
Facility: HOSPITAL | Age: 64
Discharge: HOME | End: 2024-05-04
Payer: OTHER GOVERNMENT

## 2024-05-01 DIAGNOSIS — M51.36 DDD (DEGENERATIVE DISC DISEASE), LUMBAR: ICD-10-CM

## 2024-05-01 PROCEDURE — 72148 MRI LUMBAR SPINE W/O DYE: CPT

## 2024-09-06 ENCOUNTER — OFFICE VISIT (OUTPATIENT)
Age: 64
End: 2024-09-06
Payer: OTHER GOVERNMENT

## 2024-09-06 ENCOUNTER — TELEPHONE (OUTPATIENT)
Age: 64
End: 2024-09-06

## 2024-09-06 ENCOUNTER — PREP FOR PROCEDURE (OUTPATIENT)
Age: 64
End: 2024-09-06

## 2024-09-06 VITALS
HEIGHT: 64 IN | WEIGHT: 282.31 LBS | SYSTOLIC BLOOD PRESSURE: 139 MMHG | BODY MASS INDEX: 48.2 KG/M2 | DIASTOLIC BLOOD PRESSURE: 79 MMHG

## 2024-09-06 DIAGNOSIS — N95.0 POSTMENOPAUSAL BLEEDING: Primary | ICD-10-CM

## 2024-09-06 DIAGNOSIS — E66.01 CLASS 3 SEVERE OBESITY DUE TO EXCESS CALORIES WITH BODY MASS INDEX (BMI) OF 45.0 TO 49.9 IN ADULT, UNSPECIFIED WHETHER SERIOUS COMORBIDITY PRESENT (HCC): ICD-10-CM

## 2024-09-06 DIAGNOSIS — N95.0 POSTMENOPAUSAL BLEEDING: ICD-10-CM

## 2024-09-06 DIAGNOSIS — D21.9 FIBROIDS: ICD-10-CM

## 2024-09-06 PROBLEM — D25.9 FIBROID, UTERINE: Status: ACTIVE | Noted: 2024-09-06

## 2024-09-06 PROCEDURE — 99214 OFFICE O/P EST MOD 30 MIN: CPT | Performed by: OBSTETRICS & GYNECOLOGY

## 2024-09-06 NOTE — PROGRESS NOTES
Zandra Maria is a 63 y.o. female who presents today for the following:  Chief Complaint   Patient presents with    Vaginal Bleeding     Pt presents with complaints of pelvic pain and vaginal bleeding//MKeeley     Pelvic Pain        Allergies   Allergen Reactions    Latex Itching and Rash     Reaction Type: Allergy    Other Reaction(s): Itching purpura, Rash, Itching purpura, Rash    Reaction Type: Allergy  Reaction Type: Allergy      Reaction Type: Allergy    Reaction Type: Allergy      Reaction Type: Allergy    Aspirin Anaphylaxis, Other (See Comments), Swelling and Shortness Of Breath     inflammation    Other reaction(s): Unknown    Reaction Type: Allergy; Reaction(s): inflammation/rash    Other Reaction(s): Other (see comments), Unknown    Reaction Type: Allergy; Reaction(s): inflammation/rash   inflammation    Reaction Type: Allergy; Reaction(s): inflammation/rash inflammation      inflammation Other reaction(s): Unknown Reaction Type: Allergy; Reaction(s): inflammation/rash inflammation    Iodine Other (See Comments) and Shortness Of Breath    Oxycodone Shortness Of Breath    Sertraline Itching, Swelling and Shortness Of Breath     Other Reaction(s): Rash, Swelling, Cough, Rash, Swelling, Cough, Rash, Swelling, Cough    Hydrocodone-Acetaminophen Swelling     Other Reaction(s): Tongue swelling    Iodinated Contrast Media Other (See Comments)    Oxycodone Hcl     Prednisone & Diphenhydramine        Current Outpatient Medications   Medication Sig Dispense Refill    fluticasone-salmeterol (ADVAIR DISKUS) 500-50 MCG/ACT AEPB diskus inhaler by NOT APPLICABLE route      ALBUTEROL IN See Instructions, # 30 mL, 0 total refill(s), Hard Stop      baclofen (LIORESAL) 10 MG tablet Take 0.5 tablets by mouth      TRULICITY 1.5 MG/0.5ML SC injection Inject 0.5 mLs into the skin      vitamin D3 (CHOLECALCIFEROL) 25 MCG (1000 UT) TABS tablet Take 1 tablet by mouth daily      fluticasone-salmeterol (ADVAIR) 500-50 MCG/ACT  0 = independent

## 2024-09-06 NOTE — TELEPHONE ENCOUNTER
Called and spoke with patient and she is scheduled for surgery with Dr. Carreno for Hysteroscopy D & C on 09/26/24. Patient is aware that PAT will reach out to her to schedule

## 2024-09-19 ENCOUNTER — HOSPITAL ENCOUNTER (OUTPATIENT)
Facility: HOSPITAL | Age: 64
Discharge: HOME | End: 2024-09-22
Payer: OTHER GOVERNMENT

## 2024-09-19 VITALS
DIASTOLIC BLOOD PRESSURE: 66 MMHG | TEMPERATURE: 98.6 F | BODY MASS INDEX: 49 KG/M2 | OXYGEN SATURATION: 100 % | SYSTOLIC BLOOD PRESSURE: 133 MMHG | HEIGHT: 64 IN | WEIGHT: 287 LBS | HEART RATE: 81 BPM | RESPIRATION RATE: 18 BRPM

## 2024-09-19 DIAGNOSIS — N95.0 POST-MENOPAUSE BLEEDING: Primary | ICD-10-CM

## 2024-09-19 LAB
ABO + RH BLD: NORMAL
ALBUMIN SERPL-MCNC: 3.8 G/DL (ref 3.5–5)
ALBUMIN/GLOB SERPL: 1.2 (ref 1.1–2.2)
ALP SERPL-CCNC: 108 U/L (ref 45–117)
ALT SERPL-CCNC: 20 U/L (ref 12–78)
ANION GAP SERPL CALC-SCNC: 5 MMOL/L (ref 2–12)
APPEARANCE UR: CLEAR
AST SERPL W P-5'-P-CCNC: 8 U/L (ref 15–37)
BACTERIA URNS QL MICRO: NEGATIVE /HPF
BASOPHILS # BLD: 0 K/UL (ref 0–0.1)
BASOPHILS NFR BLD: 0 % (ref 0–1)
BILIRUB SERPL-MCNC: 0.4 MG/DL (ref 0.2–1)
BILIRUB UR QL: NEGATIVE
BLOOD GROUP ANTIBODIES SERPL: NEGATIVE
BUN SERPL-MCNC: 14 MG/DL (ref 6–20)
BUN/CREAT SERPL: 17 (ref 12–20)
CA-I BLD-MCNC: 9.3 MG/DL (ref 8.5–10.1)
CHLORIDE SERPL-SCNC: 107 MMOL/L (ref 97–108)
CO2 SERPL-SCNC: 29 MMOL/L (ref 21–32)
COLOR UR: ABNORMAL
CREAT SERPL-MCNC: 0.84 MG/DL (ref 0.55–1.02)
DIFFERENTIAL METHOD BLD: ABNORMAL
EOSINOPHIL # BLD: 0 K/UL (ref 0–0.4)
EOSINOPHIL NFR BLD: 1 % (ref 0–7)
EPITH CASTS URNS QL MICRO: ABNORMAL /LPF
ERYTHROCYTE [DISTWIDTH] IN BLOOD BY AUTOMATED COUNT: 15.8 % (ref 11.5–14.5)
GLOBULIN SER CALC-MCNC: 3.2 G/DL (ref 2–4)
GLUCOSE SERPL-MCNC: 100 MG/DL (ref 65–100)
GLUCOSE UR STRIP.AUTO-MCNC: NEGATIVE MG/DL
HCT VFR BLD AUTO: 42.1 % (ref 35–47)
HGB BLD-MCNC: 13.1 G/DL (ref 11.5–16)
HGB UR QL STRIP: NEGATIVE
IMM GRANULOCYTES # BLD AUTO: 0 K/UL (ref 0–0.04)
IMM GRANULOCYTES NFR BLD AUTO: 0 % (ref 0–0.5)
KETONES UR QL STRIP.AUTO: NEGATIVE MG/DL
LEUKOCYTE ESTERASE UR QL STRIP.AUTO: ABNORMAL
LYMPHOCYTES # BLD: 1.5 K/UL (ref 0.8–3.5)
LYMPHOCYTES NFR BLD: 22 % (ref 12–49)
MCH RBC QN AUTO: 25.5 PG (ref 26–34)
MCHC RBC AUTO-ENTMCNC: 31.1 G/DL (ref 30–36.5)
MCV RBC AUTO: 82.1 FL (ref 80–99)
MONOCYTES # BLD: 0.3 K/UL (ref 0–1)
MONOCYTES NFR BLD: 5 % (ref 5–13)
MUCOUS THREADS URNS QL MICRO: ABNORMAL /LPF
NEUTS SEG # BLD: 5.1 K/UL (ref 1.8–8)
NEUTS SEG NFR BLD: 72 % (ref 32–75)
NITRITE UR QL STRIP.AUTO: NEGATIVE
NRBC # BLD: 0 K/UL (ref 0–0.01)
NRBC BLD-RTO: 0 PER 100 WBC
PH UR STRIP: 5 (ref 5–8)
PLATELET # BLD AUTO: 265 K/UL (ref 150–400)
PMV BLD AUTO: 9.6 FL (ref 8.9–12.9)
POTASSIUM SERPL-SCNC: 3.9 MMOL/L (ref 3.5–5.1)
PROT SERPL-MCNC: 7 G/DL (ref 6.4–8.2)
PROT UR STRIP-MCNC: NEGATIVE MG/DL
RBC # BLD AUTO: 5.13 M/UL (ref 3.8–5.2)
RBC #/AREA URNS HPF: ABNORMAL /HPF (ref 0–5)
SODIUM SERPL-SCNC: 141 MMOL/L (ref 136–145)
SP GR UR REFRACTOMETRY: 1.02 (ref 1–1.03)
SPECIMEN EXP DATE BLD: NORMAL
URINE CULTURE IF INDICATED: ABNORMAL
UROBILINOGEN UR QL STRIP.AUTO: 0.1 EU/DL (ref 0.1–1)
WBC # BLD AUTO: 7 K/UL (ref 3.6–11)
WBC URNS QL MICRO: ABNORMAL /HPF (ref 0–4)

## 2024-09-19 PROCEDURE — 93005 ELECTROCARDIOGRAM TRACING: CPT | Performed by: OBSTETRICS & GYNECOLOGY

## 2024-09-19 PROCEDURE — 81001 URINALYSIS AUTO W/SCOPE: CPT

## 2024-09-19 PROCEDURE — 86850 RBC ANTIBODY SCREEN: CPT

## 2024-09-19 PROCEDURE — 80053 COMPREHEN METABOLIC PANEL: CPT

## 2024-09-19 PROCEDURE — 85025 COMPLETE CBC W/AUTO DIFF WBC: CPT

## 2024-09-19 PROCEDURE — 86900 BLOOD TYPING SEROLOGIC ABO: CPT

## 2024-09-19 PROCEDURE — 36415 COLL VENOUS BLD VENIPUNCTURE: CPT

## 2024-09-19 PROCEDURE — 86901 BLOOD TYPING SEROLOGIC RH(D): CPT

## 2024-09-19 RX ORDER — TOPIRAMATE SPINKLE 25 MG/1
25 CAPSULE ORAL 2 TIMES DAILY
COMMUNITY

## 2024-09-19 ASSESSMENT — PAIN SCALES - GENERAL: PAINLEVEL_OUTOF10: 0

## 2024-09-20 ENCOUNTER — TELEPHONE (OUTPATIENT)
Age: 64
End: 2024-09-20

## 2024-09-20 ENCOUNTER — OFFICE VISIT (OUTPATIENT)
Age: 64
End: 2024-09-20
Payer: OTHER GOVERNMENT

## 2024-09-20 VITALS
BODY MASS INDEX: 49 KG/M2 | SYSTOLIC BLOOD PRESSURE: 114 MMHG | WEIGHT: 287 LBS | HEIGHT: 64 IN | DIASTOLIC BLOOD PRESSURE: 70 MMHG

## 2024-09-20 DIAGNOSIS — N95.0 POSTMENOPAUSAL BLEEDING: Primary | ICD-10-CM

## 2024-09-20 LAB
EKG ATRIAL RATE: 74 BPM
EKG DIAGNOSIS: NORMAL
EKG P AXIS: 35 DEGREES
EKG P-R INTERVAL: 146 MS
EKG Q-T INTERVAL: 352 MS
EKG QRS DURATION: 78 MS
EKG QTC CALCULATION (BAZETT): 390 MS
EKG R AXIS: -9 DEGREES
EKG T AXIS: 9 DEGREES
EKG VENTRICULAR RATE: 74 BPM

## 2024-09-20 PROCEDURE — 99213 OFFICE O/P EST LOW 20 MIN: CPT | Performed by: OBSTETRICS & GYNECOLOGY

## 2025-05-09 ENCOUNTER — TRANSCRIBE ORDERS (OUTPATIENT)
Facility: HOSPITAL | Age: 65
End: 2025-05-09

## 2025-05-09 DIAGNOSIS — H53.2 DIPLOPIA: Primary | ICD-10-CM

## 2025-06-13 ENCOUNTER — HOSPITAL ENCOUNTER (OUTPATIENT)
Facility: HOSPITAL | Age: 65
Discharge: HOME | End: 2025-06-16
Attending: PSYCHIATRY & NEUROLOGY
Payer: OTHER GOVERNMENT

## 2025-06-13 DIAGNOSIS — H53.2 DIPLOPIA: ICD-10-CM

## 2025-06-13 PROCEDURE — 6360000004 HC RX CONTRAST MEDICATION: Performed by: PSYCHIATRY & NEUROLOGY

## 2025-06-13 PROCEDURE — A9577 INJ MULTIHANCE: HCPCS | Performed by: PSYCHIATRY & NEUROLOGY

## 2025-06-13 PROCEDURE — 70553 MRI BRAIN STEM W/O & W/DYE: CPT

## 2025-06-13 RX ADMIN — GADOBENATE DIMEGLUMINE 20 ML: 529 INJECTION, SOLUTION INTRAVENOUS at 12:03

## (undated) DEVICE — SYRINGE ANGIO 10ML RED POLYCARB HI PRSS FIX M LUER

## (undated) DEVICE — SC 3W MP RA OFF PB - PG: Brand: NAMIC

## (undated) DEVICE — GUIDEWIRE VASC L260CM DIA0.035IN STR TIP L7CM PTFE FIX COR

## (undated) DEVICE — RADIFOCUS OPTITORQUE ANGIOGRAPHIC CATHETER: Brand: OPTITORQUE

## (undated) DEVICE — GLIDESHEATH SLENDER ACCESS KIT: Brand: GLIDESHEATH SLENDER

## (undated) DEVICE — SYRINGE 10 ML POLYCARBINATE PUR

## (undated) DEVICE — SURGICAL PROCEDURE TRAY CRD CATH 3 PRT

## (undated) DEVICE — DRAPE,APERTURE,MINOR PROCEDURE: Brand: MEDLINE

## (undated) DEVICE — PROTECTION STATION PRESSURIZED PG: Brand: NAMIC